# Patient Record
Sex: FEMALE | Race: WHITE | Employment: OTHER | URBAN - METROPOLITAN AREA
[De-identification: names, ages, dates, MRNs, and addresses within clinical notes are randomized per-mention and may not be internally consistent; named-entity substitution may affect disease eponyms.]

---

## 2023-08-23 RX ORDER — LATANOPROST 50 UG/ML
SOLUTION/ DROPS OPHTHALMIC
COMMUNITY

## 2023-08-23 RX ORDER — LEVOTHYROXINE SODIUM 0.1 MG/1
TABLET ORAL
COMMUNITY
Start: 2022-11-17

## 2023-08-23 RX ORDER — LOSARTAN POTASSIUM 100 MG/1
1 TABLET ORAL DAILY
COMMUNITY
Start: 2022-11-17

## 2023-08-23 RX ORDER — PRAVASTATIN SODIUM 20 MG
TABLET ORAL
COMMUNITY
Start: 2022-11-17

## 2023-08-23 RX ORDER — PANTOPRAZOLE SODIUM 40 MG/1
TABLET, DELAYED RELEASE ORAL
COMMUNITY
Start: 2022-11-17

## 2023-08-23 RX ORDER — SERTRALINE HYDROCHLORIDE 25 MG/1
TABLET, FILM COATED ORAL
COMMUNITY
Start: 2022-11-17

## 2023-08-23 RX ORDER — AMLODIPINE BESYLATE 5 MG/1
TABLET ORAL
COMMUNITY
Start: 2022-11-17

## 2023-08-24 ENCOUNTER — OFFICE VISIT (OUTPATIENT)
Dept: PRIMARY CARE CLINIC | Age: 88
End: 2023-08-24
Payer: MEDICARE

## 2023-08-24 VITALS
WEIGHT: 145.8 LBS | TEMPERATURE: 97.5 F | HEIGHT: 61 IN | SYSTOLIC BLOOD PRESSURE: 118 MMHG | DIASTOLIC BLOOD PRESSURE: 70 MMHG | BODY MASS INDEX: 27.53 KG/M2

## 2023-08-24 DIAGNOSIS — E03.9 HYPOTHYROIDISM, UNSPECIFIED TYPE: ICD-10-CM

## 2023-08-24 DIAGNOSIS — G89.29 CHRONIC BILATERAL LOW BACK PAIN WITHOUT SCIATICA: Primary | ICD-10-CM

## 2023-08-24 DIAGNOSIS — M54.50 CHRONIC BILATERAL LOW BACK PAIN WITHOUT SCIATICA: Primary | ICD-10-CM

## 2023-08-24 DIAGNOSIS — F33.42 RECURRENT MAJOR DEPRESSIVE DISORDER, IN FULL REMISSION (HCC): ICD-10-CM

## 2023-08-24 DIAGNOSIS — I10 PRIMARY HYPERTENSION: ICD-10-CM

## 2023-08-24 DIAGNOSIS — M35.01 SJOGREN'S SYNDROME WITH KERATOCONJUNCTIVITIS SICCA (HCC): ICD-10-CM

## 2023-08-24 PROCEDURE — G8427 DOCREV CUR MEDS BY ELIG CLIN: HCPCS | Performed by: STUDENT IN AN ORGANIZED HEALTH CARE EDUCATION/TRAINING PROGRAM

## 2023-08-24 PROCEDURE — 99204 OFFICE O/P NEW MOD 45 MIN: CPT | Performed by: STUDENT IN AN ORGANIZED HEALTH CARE EDUCATION/TRAINING PROGRAM

## 2023-08-24 PROCEDURE — G8419 CALC BMI OUT NRM PARAM NOF/U: HCPCS | Performed by: STUDENT IN AN ORGANIZED HEALTH CARE EDUCATION/TRAINING PROGRAM

## 2023-08-24 PROCEDURE — 1036F TOBACCO NON-USER: CPT | Performed by: STUDENT IN AN ORGANIZED HEALTH CARE EDUCATION/TRAINING PROGRAM

## 2023-08-24 PROCEDURE — 1123F ACP DISCUSS/DSCN MKR DOCD: CPT | Performed by: STUDENT IN AN ORGANIZED HEALTH CARE EDUCATION/TRAINING PROGRAM

## 2023-08-24 PROCEDURE — 1090F PRES/ABSN URINE INCON ASSESS: CPT | Performed by: STUDENT IN AN ORGANIZED HEALTH CARE EDUCATION/TRAINING PROGRAM

## 2023-08-24 RX ORDER — FAMOTIDINE 40 MG/1
TABLET, FILM COATED ORAL
COMMUNITY
Start: 2023-07-03

## 2023-08-24 RX ORDER — TRAMADOL HYDROCHLORIDE 50 MG/1
TABLET ORAL
COMMUNITY
Start: 2023-05-10

## 2023-08-24 SDOH — ECONOMIC STABILITY: HOUSING INSECURITY
IN THE LAST 12 MONTHS, WAS THERE A TIME WHEN YOU DID NOT HAVE A STEADY PLACE TO SLEEP OR SLEPT IN A SHELTER (INCLUDING NOW)?: NO

## 2023-08-24 SDOH — ECONOMIC STABILITY: FOOD INSECURITY: WITHIN THE PAST 12 MONTHS, THE FOOD YOU BOUGHT JUST DIDN'T LAST AND YOU DIDN'T HAVE MONEY TO GET MORE.: NEVER TRUE

## 2023-08-24 SDOH — ECONOMIC STABILITY: INCOME INSECURITY: HOW HARD IS IT FOR YOU TO PAY FOR THE VERY BASICS LIKE FOOD, HOUSING, MEDICAL CARE, AND HEATING?: NOT HARD AT ALL

## 2023-08-24 SDOH — ECONOMIC STABILITY: FOOD INSECURITY: WITHIN THE PAST 12 MONTHS, YOU WORRIED THAT YOUR FOOD WOULD RUN OUT BEFORE YOU GOT MONEY TO BUY MORE.: NEVER TRUE

## 2023-08-24 ASSESSMENT — PATIENT HEALTH QUESTIONNAIRE - PHQ9
6. FEELING BAD ABOUT YOURSELF - OR THAT YOU ARE A FAILURE OR HAVE LET YOURSELF OR YOUR FAMILY DOWN: 0
7. TROUBLE CONCENTRATING ON THINGS, SUCH AS READING THE NEWSPAPER OR WATCHING TELEVISION: 3
4. FEELING TIRED OR HAVING LITTLE ENERGY: 3
2. FEELING DOWN, DEPRESSED OR HOPELESS: 0
2. FEELING DOWN, DEPRESSED OR HOPELESS: 1
3. TROUBLE FALLING OR STAYING ASLEEP: 0
SUM OF ALL RESPONSES TO PHQ QUESTIONS 1-9: 7
SUM OF ALL RESPONSES TO PHQ9 QUESTIONS 1 & 2: 1
SUM OF ALL RESPONSES TO PHQ QUESTIONS 1-9: 7
SUM OF ALL RESPONSES TO PHQ QUESTIONS 1-9: 0
1. LITTLE INTEREST OR PLEASURE IN DOING THINGS: 0
SUM OF ALL RESPONSES TO PHQ QUESTIONS 1-9: 0
SUM OF ALL RESPONSES TO PHQ9 QUESTIONS 1 & 2: 0
SUM OF ALL RESPONSES TO PHQ QUESTIONS 1-9: 0
8. MOVING OR SPEAKING SO SLOWLY THAT OTHER PEOPLE COULD HAVE NOTICED. OR THE OPPOSITE, BEING SO FIGETY OR RESTLESS THAT YOU HAVE BEEN MOVING AROUND A LOT MORE THAN USUAL: 0
SUM OF ALL RESPONSES TO PHQ QUESTIONS 1-9: 7
5. POOR APPETITE OR OVEREATING: 0
SUM OF ALL RESPONSES TO PHQ QUESTIONS 1-9: 0
9. THOUGHTS THAT YOU WOULD BE BETTER OFF DEAD, OR OF HURTING YOURSELF: 0
1. LITTLE INTEREST OR PLEASURE IN DOING THINGS: 0
SUM OF ALL RESPONSES TO PHQ QUESTIONS 1-9: 7
10. IF YOU CHECKED OFF ANY PROBLEMS, HOW DIFFICULT HAVE THESE PROBLEMS MADE IT FOR YOU TO DO YOUR WORK, TAKE CARE OF THINGS AT HOME, OR GET ALONG WITH OTHER PEOPLE: 0

## 2023-08-24 ASSESSMENT — ENCOUNTER SYMPTOMS
RESPIRATORY NEGATIVE: 1
EYES NEGATIVE: 1
BACK PAIN: 1

## 2023-08-28 DIAGNOSIS — E03.9 HYPOTHYROIDISM, UNSPECIFIED TYPE: ICD-10-CM

## 2023-08-29 LAB
T4 FREE: 1.4 NG/DL (ref 0.9–1.7)
TSH SERPL DL<=0.05 MIU/L-ACNC: 11 UIU/ML (ref 0.27–4.2)

## 2023-08-30 ENCOUNTER — TELEPHONE (OUTPATIENT)
Dept: PRIMARY CARE CLINIC | Age: 88
End: 2023-08-30

## 2023-08-30 NOTE — TELEPHONE ENCOUNTER
Daughter called wanting to know lab results and if any additional medication is needed.  She would like someone to call her to discuss next steps regarding lab work

## 2023-08-31 DIAGNOSIS — E03.9 HYPOTHYROIDISM, UNSPECIFIED TYPE: Primary | ICD-10-CM

## 2023-08-31 RX ORDER — LEVOTHYROXINE SODIUM 0.1 MG/1
TABLET ORAL
Qty: 90 TABLET | Refills: 1 | Status: SHIPPED | OUTPATIENT
Start: 2023-08-31

## 2023-08-31 RX ORDER — LEVOTHYROXINE SODIUM 0.03 MG/1
TABLET ORAL
Qty: 30 TABLET | Refills: 5 | Status: SHIPPED | OUTPATIENT
Start: 2023-08-31

## 2023-11-21 DIAGNOSIS — E03.9 HYPOTHYROIDISM, UNSPECIFIED TYPE: ICD-10-CM

## 2023-11-21 DIAGNOSIS — I10 PRIMARY HYPERTENSION: Primary | ICD-10-CM

## 2023-11-21 DIAGNOSIS — E78.5 HYPERLIPIDEMIA, UNSPECIFIED HYPERLIPIDEMIA TYPE: ICD-10-CM

## 2023-11-21 RX ORDER — AMLODIPINE BESYLATE 5 MG/1
TABLET ORAL
Qty: 90 TABLET | Refills: 3 | Status: SHIPPED | OUTPATIENT
Start: 2023-11-21

## 2023-11-21 RX ORDER — PANTOPRAZOLE SODIUM 40 MG/1
TABLET, DELAYED RELEASE ORAL
Qty: 90 TABLET | Refills: 1 | Status: SHIPPED | OUTPATIENT
Start: 2023-11-21

## 2023-11-21 RX ORDER — PRAVASTATIN SODIUM 20 MG
TABLET ORAL
Qty: 90 TABLET | Refills: 1 | Status: SHIPPED | OUTPATIENT
Start: 2023-11-21

## 2023-11-21 RX ORDER — LOSARTAN POTASSIUM 100 MG/1
100 TABLET ORAL DAILY
Qty: 90 TABLET | Refills: 3 | Status: SHIPPED | OUTPATIENT
Start: 2023-11-21

## 2023-11-21 RX ORDER — SERTRALINE HYDROCHLORIDE 25 MG/1
TABLET, FILM COATED ORAL
Qty: 90 TABLET | Refills: 3 | Status: SHIPPED | OUTPATIENT
Start: 2023-11-21

## 2023-11-21 RX ORDER — LEVOTHYROXINE SODIUM 0.1 MG/1
TABLET ORAL
Qty: 90 TABLET | Refills: 1 | Status: SHIPPED | OUTPATIENT
Start: 2023-11-21

## 2023-11-21 RX ORDER — LEVOTHYROXINE SODIUM 0.03 MG/1
TABLET ORAL
Qty: 30 TABLET | Refills: 5 | Status: SHIPPED | OUTPATIENT
Start: 2023-11-21

## 2023-11-21 RX ORDER — FAMOTIDINE 40 MG/1
40 TABLET, FILM COATED ORAL NIGHTLY PRN
Qty: 90 TABLET | Refills: 1 | Status: SHIPPED | OUTPATIENT
Start: 2023-11-21 | End: 2024-02-19

## 2024-01-08 SDOH — HEALTH STABILITY: PHYSICAL HEALTH: ON AVERAGE, HOW MANY DAYS PER WEEK DO YOU ENGAGE IN MODERATE TO STRENUOUS EXERCISE (LIKE A BRISK WALK)?: 0 DAYS

## 2024-01-08 SDOH — HEALTH STABILITY: PHYSICAL HEALTH: ON AVERAGE, HOW MANY MINUTES DO YOU ENGAGE IN EXERCISE AT THIS LEVEL?: 0 MIN

## 2024-01-11 ENCOUNTER — APPOINTMENT (OUTPATIENT)
Dept: GENERAL RADIOLOGY | Age: 89
DRG: 177 | End: 2024-01-11
Payer: MEDICARE

## 2024-01-11 ENCOUNTER — OFFICE VISIT (OUTPATIENT)
Dept: PRIMARY CARE CLINIC | Age: 89
End: 2024-01-11
Payer: MEDICARE

## 2024-01-11 ENCOUNTER — HOSPITAL ENCOUNTER (INPATIENT)
Age: 89
LOS: 1 days | Discharge: HOME HEALTH | DRG: 177 | End: 2024-01-15
Attending: EMERGENCY MEDICINE | Admitting: INTERNAL MEDICINE
Payer: MEDICARE

## 2024-01-11 VITALS
DIASTOLIC BLOOD PRESSURE: 70 MMHG | HEART RATE: 80 BPM | BODY MASS INDEX: 27.85 KG/M2 | WEIGHT: 147.5 LBS | SYSTOLIC BLOOD PRESSURE: 128 MMHG | TEMPERATURE: 97.1 F | HEIGHT: 61 IN | OXYGEN SATURATION: 86 %

## 2024-01-11 DIAGNOSIS — J96.01 ACUTE RESPIRATORY FAILURE WITH HYPOXIA (HCC): Primary | ICD-10-CM

## 2024-01-11 DIAGNOSIS — I10 PRIMARY HYPERTENSION: ICD-10-CM

## 2024-01-11 DIAGNOSIS — M35.00 SJOGREN'S SYNDROME, WITH UNSPECIFIED ORGAN INVOLVEMENT (HCC): ICD-10-CM

## 2024-01-11 DIAGNOSIS — F32.A DEPRESSIVE DISORDER: ICD-10-CM

## 2024-01-11 DIAGNOSIS — E03.9 HYPOTHYROIDISM, UNSPECIFIED TYPE: ICD-10-CM

## 2024-01-11 DIAGNOSIS — J96.01 SEPSIS DUE TO STREPTOCOCCUS PNEUMONIAE WITH ACUTE HYPOXIC RESPIRATORY FAILURE WITHOUT SEPTIC SHOCK (HCC): Primary | ICD-10-CM

## 2024-01-11 DIAGNOSIS — R65.20 SEPSIS DUE TO STREPTOCOCCUS PNEUMONIAE WITH ACUTE HYPOXIC RESPIRATORY FAILURE WITHOUT SEPTIC SHOCK (HCC): Primary | ICD-10-CM

## 2024-01-11 DIAGNOSIS — U07.1 COVID-19: ICD-10-CM

## 2024-01-11 DIAGNOSIS — E78.5 HYPERLIPIDEMIA, UNSPECIFIED HYPERLIPIDEMIA TYPE: ICD-10-CM

## 2024-01-11 DIAGNOSIS — R41.89 IMPAIRED COGNITION: ICD-10-CM

## 2024-01-11 DIAGNOSIS — E87.1 HYPONATREMIA: ICD-10-CM

## 2024-01-11 DIAGNOSIS — A40.3 SEPSIS DUE TO STREPTOCOCCUS PNEUMONIAE WITH ACUTE HYPOXIC RESPIRATORY FAILURE WITHOUT SEPTIC SHOCK (HCC): Primary | ICD-10-CM

## 2024-01-11 DIAGNOSIS — M54.16 LUMBAR RADICULOPATHY: ICD-10-CM

## 2024-01-11 DIAGNOSIS — F33.42 RECURRENT MAJOR DEPRESSIVE DISORDER, IN FULL REMISSION (HCC): ICD-10-CM

## 2024-01-11 PROBLEM — R09.02 HYPOXIA: Status: ACTIVE | Noted: 2024-01-11

## 2024-01-11 LAB
ALBUMIN SERPL-MCNC: 3.6 G/DL (ref 3.5–5.2)
ALP SERPL-CCNC: 140 U/L (ref 35–104)
ALT SERPL-CCNC: 12 U/L (ref 0–32)
ANION GAP SERPL CALCULATED.3IONS-SCNC: 11 MMOL/L (ref 7–16)
AST SERPL-CCNC: 25 U/L (ref 0–31)
BASOPHILS # BLD: 0.03 K/UL (ref 0–0.2)
BASOPHILS NFR BLD: 0 % (ref 0–2)
BILIRUB SERPL-MCNC: 0.4 MG/DL (ref 0–1.2)
BNP SERPL-MCNC: 635 PG/ML (ref 0–450)
BUN SERPL-MCNC: 15 MG/DL (ref 6–23)
CALCIUM SERPL-MCNC: 9.4 MG/DL (ref 8.6–10.2)
CHLORIDE SERPL-SCNC: 92 MMOL/L (ref 98–107)
CO2 SERPL-SCNC: 27 MMOL/L (ref 22–29)
CREAT SERPL-MCNC: 0.5 MG/DL (ref 0.5–1)
EKG ATRIAL RATE: 76 BPM
EKG P AXIS: 13 DEGREES
EKG P-R INTERVAL: 160 MS
EKG Q-T INTERVAL: 400 MS
EKG QRS DURATION: 76 MS
EKG QTC CALCULATION (BAZETT): 450 MS
EKG R AXIS: -17 DEGREES
EKG T AXIS: -18 DEGREES
EKG VENTRICULAR RATE: 76 BPM
EOSINOPHIL # BLD: 0.61 K/UL (ref 0.05–0.5)
EOSINOPHILS RELATIVE PERCENT: 7 % (ref 0–6)
ERYTHROCYTE [DISTWIDTH] IN BLOOD BY AUTOMATED COUNT: 15.4 % (ref 11.5–15)
GFR SERPL CREATININE-BSD FRML MDRD: >60 ML/MIN/1.73M2
GLUCOSE SERPL-MCNC: 108 MG/DL (ref 74–99)
HCT VFR BLD AUTO: 33.6 % (ref 34–48)
HGB BLD-MCNC: 11.5 G/DL (ref 11.5–15.5)
IMM GRANULOCYTES # BLD AUTO: 0.05 K/UL (ref 0–0.58)
IMM GRANULOCYTES NFR BLD: 1 % (ref 0–5)
INFLUENZA A BY PCR: NOT DETECTED
INFLUENZA B BY PCR: NOT DETECTED
LACTATE BLDV-SCNC: 1.2 MMOL/L (ref 0.5–2.2)
LYMPHOCYTES NFR BLD: 0.68 K/UL (ref 1.5–4)
LYMPHOCYTES RELATIVE PERCENT: 7 % (ref 20–42)
MCH RBC QN AUTO: 27.1 PG (ref 26–35)
MCHC RBC AUTO-ENTMCNC: 34.2 G/DL (ref 32–34.5)
MCV RBC AUTO: 79.1 FL (ref 80–99.9)
MONOCYTES NFR BLD: 1.2 K/UL (ref 0.1–0.95)
MONOCYTES NFR BLD: 13 % (ref 2–12)
NEUTROPHILS NFR BLD: 73 % (ref 43–80)
NEUTS SEG NFR BLD: 6.82 K/UL (ref 1.8–7.3)
PLATELET # BLD AUTO: 398 K/UL (ref 130–450)
PMV BLD AUTO: 8.7 FL (ref 7–12)
POTASSIUM SERPL-SCNC: 3.8 MMOL/L (ref 3.5–5)
PROT SERPL-MCNC: 6.8 G/DL (ref 6.4–8.3)
RBC # BLD AUTO: 4.25 M/UL (ref 3.5–5.5)
RSV BY PCR: NOT DETECTED
SARS-COV-2 RDRP RESP QL NAA+PROBE: DETECTED
SODIUM SERPL-SCNC: 130 MMOL/L (ref 132–146)
SPECIMEN DESCRIPTION: ABNORMAL
SPECIMEN SOURCE: NORMAL
TROPONIN I SERPL HS-MCNC: 13 NG/L (ref 0–9)
WBC OTHER # BLD: 9.4 K/UL (ref 4.5–11.5)

## 2024-01-11 PROCEDURE — G0378 HOSPITAL OBSERVATION PER HR: HCPCS

## 2024-01-11 PROCEDURE — 93005 ELECTROCARDIOGRAM TRACING: CPT | Performed by: EMERGENCY MEDICINE

## 2024-01-11 PROCEDURE — 87502 INFLUENZA DNA AMP PROBE: CPT

## 2024-01-11 PROCEDURE — 71045 X-RAY EXAM CHEST 1 VIEW: CPT

## 2024-01-11 PROCEDURE — 6370000000 HC RX 637 (ALT 250 FOR IP): Performed by: EMERGENCY MEDICINE

## 2024-01-11 PROCEDURE — 96372 THER/PROPH/DIAG INJ SC/IM: CPT

## 2024-01-11 PROCEDURE — G8484 FLU IMMUNIZE NO ADMIN: HCPCS | Performed by: INTERNAL MEDICINE

## 2024-01-11 PROCEDURE — 80053 COMPREHEN METABOLIC PANEL: CPT

## 2024-01-11 PROCEDURE — 87635 SARS-COV-2 COVID-19 AMP PRB: CPT

## 2024-01-11 PROCEDURE — 99285 EMERGENCY DEPT VISIT HI MDM: CPT

## 2024-01-11 PROCEDURE — 6360000002 HC RX W HCPCS: Performed by: EMERGENCY MEDICINE

## 2024-01-11 PROCEDURE — 94664 DEMO&/EVAL PT USE INHALER: CPT

## 2024-01-11 PROCEDURE — 1090F PRES/ABSN URINE INCON ASSESS: CPT | Performed by: INTERNAL MEDICINE

## 2024-01-11 PROCEDURE — 96366 THER/PROPH/DIAG IV INF ADDON: CPT

## 2024-01-11 PROCEDURE — G8419 CALC BMI OUT NRM PARAM NOF/U: HCPCS | Performed by: INTERNAL MEDICINE

## 2024-01-11 PROCEDURE — 87634 RSV DNA/RNA AMP PROBE: CPT

## 2024-01-11 PROCEDURE — 1036F TOBACCO NON-USER: CPT | Performed by: INTERNAL MEDICINE

## 2024-01-11 PROCEDURE — 6360000002 HC RX W HCPCS

## 2024-01-11 PROCEDURE — 99215 OFFICE O/P EST HI 40 MIN: CPT | Performed by: INTERNAL MEDICINE

## 2024-01-11 PROCEDURE — 84484 ASSAY OF TROPONIN QUANT: CPT

## 2024-01-11 PROCEDURE — 6360000002 HC RX W HCPCS: Performed by: SPECIALIST

## 2024-01-11 PROCEDURE — 85025 COMPLETE CBC W/AUTO DIFF WBC: CPT

## 2024-01-11 PROCEDURE — 83605 ASSAY OF LACTIC ACID: CPT

## 2024-01-11 PROCEDURE — 93010 ELECTROCARDIOGRAM REPORT: CPT | Performed by: INTERNAL MEDICINE

## 2024-01-11 PROCEDURE — 86140 C-REACTIVE PROTEIN: CPT

## 2024-01-11 PROCEDURE — 96374 THER/PROPH/DIAG INJ IV PUSH: CPT

## 2024-01-11 PROCEDURE — 96375 TX/PRO/DX INJ NEW DRUG ADDON: CPT

## 2024-01-11 PROCEDURE — 96365 THER/PROPH/DIAG IV INF INIT: CPT

## 2024-01-11 PROCEDURE — 6370000000 HC RX 637 (ALT 250 FOR IP)

## 2024-01-11 PROCEDURE — 83880 ASSAY OF NATRIURETIC PEPTIDE: CPT

## 2024-01-11 PROCEDURE — G8427 DOCREV CUR MEDS BY ELIG CLIN: HCPCS | Performed by: INTERNAL MEDICINE

## 2024-01-11 PROCEDURE — 2580000003 HC RX 258: Performed by: SPECIALIST

## 2024-01-11 PROCEDURE — 84145 PROCALCITONIN (PCT): CPT

## 2024-01-11 PROCEDURE — 1123F ACP DISCUSS/DSCN MKR DOCD: CPT | Performed by: INTERNAL MEDICINE

## 2024-01-11 RX ORDER — LATANOPROST 50 UG/ML
1 SOLUTION/ DROPS OPHTHALMIC NIGHTLY
Status: DISCONTINUED | OUTPATIENT
Start: 2024-01-11 | End: 2024-01-15 | Stop reason: HOSPADM

## 2024-01-11 RX ORDER — IPRATROPIUM BROMIDE AND ALBUTEROL SULFATE 2.5; .5 MG/3ML; MG/3ML
1 SOLUTION RESPIRATORY (INHALATION) ONCE
Status: COMPLETED | OUTPATIENT
Start: 2024-01-11 | End: 2024-01-11

## 2024-01-11 RX ORDER — PANTOPRAZOLE SODIUM 40 MG/1
TABLET, DELAYED RELEASE ORAL
Qty: 90 TABLET | Refills: 1 | Status: ON HOLD | OUTPATIENT
Start: 2024-01-11

## 2024-01-11 RX ORDER — ENOXAPARIN SODIUM 100 MG/ML
40 INJECTION SUBCUTANEOUS DAILY
Status: DISCONTINUED | OUTPATIENT
Start: 2024-01-11 | End: 2024-01-15 | Stop reason: HOSPADM

## 2024-01-11 RX ORDER — LEVOTHYROXINE SODIUM 0.03 MG/1
25 TABLET ORAL
Status: DISCONTINUED | OUTPATIENT
Start: 2024-01-12 | End: 2024-01-15 | Stop reason: HOSPADM

## 2024-01-11 RX ORDER — POLYVINYL ALCOHOL 14 MG/ML
1 SOLUTION/ DROPS OPHTHALMIC EVERY 4 HOURS PRN
Status: DISCONTINUED | OUTPATIENT
Start: 2024-01-11 | End: 2024-01-11 | Stop reason: CLARIF

## 2024-01-11 RX ORDER — PRAVASTATIN SODIUM 20 MG
20 TABLET ORAL NIGHTLY
Status: DISCONTINUED | OUTPATIENT
Start: 2024-01-11 | End: 2024-01-15 | Stop reason: HOSPADM

## 2024-01-11 RX ORDER — ARFORMOTEROL TARTRATE 15 UG/2ML
15 SOLUTION RESPIRATORY (INHALATION)
Status: DISCONTINUED | OUTPATIENT
Start: 2024-01-11 | End: 2024-01-15 | Stop reason: HOSPADM

## 2024-01-11 RX ORDER — FUROSEMIDE 10 MG/ML
20 INJECTION INTRAMUSCULAR; INTRAVENOUS DAILY
Status: DISCONTINUED | OUTPATIENT
Start: 2024-01-11 | End: 2024-01-13

## 2024-01-11 RX ORDER — BUDESONIDE 0.5 MG/2ML
500 INHALANT ORAL
Status: DISCONTINUED | OUTPATIENT
Start: 2024-01-11 | End: 2024-01-15 | Stop reason: HOSPADM

## 2024-01-11 RX ORDER — CYCLOSPORINE 0.5 MG/ML
1 EMULSION OPHTHALMIC 2 TIMES DAILY
Status: ON HOLD | COMMUNITY

## 2024-01-11 RX ORDER — DEXAMETHASONE SODIUM PHOSPHATE 10 MG/ML
6 INJECTION INTRAMUSCULAR; INTRAVENOUS EVERY 24 HOURS
Status: DISCONTINUED | OUTPATIENT
Start: 2024-01-12 | End: 2024-01-13

## 2024-01-11 RX ORDER — LOSARTAN POTASSIUM 50 MG/1
100 TABLET ORAL DAILY
Status: DISCONTINUED | OUTPATIENT
Start: 2024-01-11 | End: 2024-01-15 | Stop reason: HOSPADM

## 2024-01-11 RX ORDER — LEVOTHYROXINE SODIUM 0.1 MG/1
100 TABLET ORAL DAILY
Status: DISCONTINUED | OUTPATIENT
Start: 2024-01-12 | End: 2024-01-15 | Stop reason: HOSPADM

## 2024-01-11 RX ORDER — LEVOTHYROXINE SODIUM 0.1 MG/1
TABLET ORAL
Qty: 90 TABLET | Refills: 1 | Status: ON HOLD | OUTPATIENT
Start: 2024-01-11

## 2024-01-11 RX ORDER — TRAMADOL HYDROCHLORIDE 50 MG/1
50 TABLET ORAL 2 TIMES DAILY
Status: DISCONTINUED | OUTPATIENT
Start: 2024-01-11 | End: 2024-01-15 | Stop reason: HOSPADM

## 2024-01-11 RX ORDER — DEXAMETHASONE SODIUM PHOSPHATE 10 MG/ML
10 INJECTION INTRAMUSCULAR; INTRAVENOUS ONCE
Status: COMPLETED | OUTPATIENT
Start: 2024-01-11 | End: 2024-01-11

## 2024-01-11 RX ORDER — TRAMADOL HYDROCHLORIDE 50 MG/1
TABLET ORAL
Status: CANCELLED | OUTPATIENT
Start: 2024-01-11

## 2024-01-11 RX ORDER — DEXAMETHASONE SODIUM PHOSPHATE 10 MG/ML
10 INJECTION INTRAMUSCULAR; INTRAVENOUS ONCE
Status: DISCONTINUED | OUTPATIENT
Start: 2024-01-11 | End: 2024-01-11

## 2024-01-11 RX ORDER — PANTOPRAZOLE SODIUM 40 MG/1
40 TABLET, DELAYED RELEASE ORAL
Status: DISCONTINUED | OUTPATIENT
Start: 2024-01-12 | End: 2024-01-15 | Stop reason: HOSPADM

## 2024-01-11 RX ORDER — FAMOTIDINE 40 MG/1
40 TABLET, FILM COATED ORAL NIGHTLY
Qty: 90 TABLET | Refills: 1 | Status: ON HOLD | OUTPATIENT
Start: 2024-01-11 | End: 2024-07-09

## 2024-01-11 RX ORDER — IPRATROPIUM BROMIDE AND ALBUTEROL SULFATE 2.5; .5 MG/3ML; MG/3ML
1 SOLUTION RESPIRATORY (INHALATION) EVERY 4 HOURS PRN
Status: DISCONTINUED | OUTPATIENT
Start: 2024-01-11 | End: 2024-01-15 | Stop reason: HOSPADM

## 2024-01-11 RX ORDER — AMLODIPINE BESYLATE 5 MG/1
5 TABLET ORAL DAILY
Status: DISCONTINUED | OUTPATIENT
Start: 2024-01-12 | End: 2024-01-15 | Stop reason: HOSPADM

## 2024-01-11 RX ORDER — PRAVASTATIN SODIUM 20 MG
TABLET ORAL
Qty: 90 TABLET | Refills: 1 | Status: ON HOLD | OUTPATIENT
Start: 2024-01-11

## 2024-01-11 RX ADMIN — AZITHROMYCIN MONOHYDRATE 500 MG: 500 INJECTION, POWDER, LYOPHILIZED, FOR SOLUTION INTRAVENOUS at 21:18

## 2024-01-11 RX ADMIN — ENOXAPARIN SODIUM 40 MG: 100 INJECTION SUBCUTANEOUS at 21:14

## 2024-01-11 RX ADMIN — LOSARTAN POTASSIUM 100 MG: 50 TABLET, FILM COATED ORAL at 21:04

## 2024-01-11 RX ADMIN — PRAVASTATIN SODIUM 20 MG: 20 TABLET ORAL at 21:04

## 2024-01-11 RX ADMIN — CEFTRIAXONE SODIUM 2000 MG: 2 INJECTION, POWDER, FOR SOLUTION INTRAMUSCULAR; INTRAVENOUS at 21:08

## 2024-01-11 RX ADMIN — DEXAMETHASONE SODIUM PHOSPHATE 10 MG: 10 INJECTION INTRAMUSCULAR; INTRAVENOUS at 18:21

## 2024-01-11 RX ADMIN — IPRATROPIUM BROMIDE AND ALBUTEROL SULFATE 1 DOSE: .5; 2.5 SOLUTION RESPIRATORY (INHALATION) at 17:48

## 2024-01-11 RX ADMIN — FUROSEMIDE 20 MG: 10 INJECTION, SOLUTION INTRAMUSCULAR; INTRAVENOUS at 21:13

## 2024-01-11 RX ADMIN — TRAMADOL HYDROCHLORIDE 50 MG: 50 TABLET ORAL at 21:04

## 2024-01-11 ASSESSMENT — PATIENT HEALTH QUESTIONNAIRE - PHQ9
SUM OF ALL RESPONSES TO PHQ9 QUESTIONS 1 & 2: 0
SUM OF ALL RESPONSES TO PHQ QUESTIONS 1-9: 0
SUM OF ALL RESPONSES TO PHQ QUESTIONS 1-9: 0
6. FEELING BAD ABOUT YOURSELF - OR THAT YOU ARE A FAILURE OR HAVE LET YOURSELF OR YOUR FAMILY DOWN: 0
2. FEELING DOWN, DEPRESSED OR HOPELESS: 0
SUM OF ALL RESPONSES TO PHQ QUESTIONS 1-9: 0
8. MOVING OR SPEAKING SO SLOWLY THAT OTHER PEOPLE COULD HAVE NOTICED. OR THE OPPOSITE, BEING SO FIGETY OR RESTLESS THAT YOU HAVE BEEN MOVING AROUND A LOT MORE THAN USUAL: 0
SUM OF ALL RESPONSES TO PHQ QUESTIONS 1-9: 0
5. POOR APPETITE OR OVEREATING: 0
4. FEELING TIRED OR HAVING LITTLE ENERGY: 0
1. LITTLE INTEREST OR PLEASURE IN DOING THINGS: 0
9. THOUGHTS THAT YOU WOULD BE BETTER OFF DEAD, OR OF HURTING YOURSELF: 0
10. IF YOU CHECKED OFF ANY PROBLEMS, HOW DIFFICULT HAVE THESE PROBLEMS MADE IT FOR YOU TO DO YOUR WORK, TAKE CARE OF THINGS AT HOME, OR GET ALONG WITH OTHER PEOPLE: 0
3. TROUBLE FALLING OR STAYING ASLEEP: 0
7. TROUBLE CONCENTRATING ON THINGS, SUCH AS READING THE NEWSPAPER OR WATCHING TELEVISION: 0

## 2024-01-11 ASSESSMENT — PAIN - FUNCTIONAL ASSESSMENT: PAIN_FUNCTIONAL_ASSESSMENT: NONE - DENIES PAIN

## 2024-01-11 ASSESSMENT — PAIN SCALES - GENERAL: PAINLEVEL_OUTOF10: 5

## 2024-01-11 NOTE — PROGRESS NOTES
HPI:  The patient is a 89 y.o. female who presents today to establish care.  Patient is a 89 y/o F with a PMHx of Sjogren's, depression, dementia, GERD, hypothyroidism and chronic back pain who presents to establish care.   Chronic back pain- has been on tramadol 50 mg TID     She is primarily wheel chair bound now;  she lives with her daughter who is accompanying her today with the caregiver.  As a matter fact she just fell down around New Year's    Patient has been sick for the last 10 to 12 days with progressive coughing spells associated with some low-grade temperature and mild swelling of bilateral lower extremities.  She appears very exhausted and worn out.  Her pulse ox today is 86% and she is wearing her gloves secondary to her Raynaud's disease.  She is breathing heavily with some audible rhonchi.  On exam there is decreased bilateral air exchange with scattered rhonchi especially on the right base suggestive of probably pneumonia.  Patient and her daughter were counseled in length that she needs to be admitted to the hospital to get some IV antibiotics and IV fluid hydration and check her chest x-ray and her lab work.  She was tested for COVID yesterday and was negative.     She has a baseline of dementia and very hard of hearing.  She does have some incontinence at night and wears depends at night      Hypothyroidism- on synthroid 100 mcg and had this adjusted in January but has not had labs drawn since to check levels     GERD- on PPI and famotidine     HTN- on losartan and amlodipine and BP well controlled     Depression- she is on sertraline 25 mg; she had been on 50 mg in the past and felt this was too much for her      She follows with dermatology     She has a history of raynaud's and often wears gloves, even in summer      Past Medical History:   Diagnosis Date    Cataract     Hx of Sjogren's disease (HCC)     Raynaud disease     Thyroid cancer (HCC)       No past surgical history on file.   No

## 2024-01-11 NOTE — ED PROVIDER NOTES
INSTILL 1 DROP INTO AFFECTED EYE(S) BY OPHTHALMIC ROUTE ONCE DAILY IN THE EVENING    LEVOTHYROXINE (SYNTHROID) 100 MCG TABLET    Take 1 tablet every day by oral route for 90 days.    LEVOTHYROXINE (SYNTHROID) 25 MCG TABLET    Take 1 pill on Monday, Wednesday and Friday    LOSARTAN (COZAAR) 100 MG TABLET    Take 1 tablet by mouth daily    MULTIPLE VITAMINS-MINERALS (PRESERVISION AREDS PO)    one tablet daily    PANTOPRAZOLE (PROTONIX) 40 MG TABLET    one tablet daily 30 minuters before lunch    POLYETHYL GLYCOL-PROPYL GLYCOL 0.4-0.3 % (SYSTANE) 0.4-0.3 % OPHTHALMIC SOLUTION    Place 1 drop into both eyes as needed for Dry Eyes    PRAVASTATIN (PRAVACHOL) 20 MG TABLET    Take 1 tablet every day by oral route.    SERTRALINE (ZOLOFT) 25 MG TABLET    Take 1 tablet every day by oral route for 90 days.    TRAMADOL (ULTRAM) 50 MG TABLET    Take 1 tablet every 8 hours by oral route as needed.       ALLERGIES     Atenolol and Tetracycline    FAMILYHISTORY     History reviewed. No pertinent family history.     SOCIAL HISTORY       Social History     Tobacco Use    Smoking status: Never     Passive exposure: Never    Smokeless tobacco: Never   Vaping Use    Vaping Use: Never used   Substance Use Topics    Alcohol use: Not Currently    Drug use: Never       SCREENINGS        Paolo Coma Scale  Eye Opening: Spontaneous  Best Verbal Response: Confused  Best Motor Response: Obeys commands  [place] Coma Scale Score: 14                CIWA Assessment  BP: (!) 160/76  Pulse: 90           PHYSICAL EXAM  1 or more Elements     ED Triage Vitals [01/11/24 1458]   BP Temp Temp Source Pulse Respirations SpO2 Height Weight - Scale   (!) 156/77 98.1 °F (36.7 °C) Oral 76 20 97 % 1.575 m (5' 2\") 66.7 kg (147 lb)       Constitutional/General: Alert and oriented x3  Head: Normocephalic and atraumatic  Eyes: PERRL, EOMI, conjunctiva normal, sclera non icteric  ENT:  Oropharynx clear, handling secretions, no trismus, no asymmetry of the posterior  considerations/shared decision making with patient, consults, disposition:        ED Course as of 01/11/24 2352   Thu Jan 11, 2024   1612 EKG Interpretation  Interpreted by emergency department physician, Dr. Boone    Rhythm: normal sinus   Rate: normal  Axis: normal  Ectopy: none  Conduction: normal  ST Segments: no acute change  T Waves: no acute change  Q Waves: none    Clinical Impression: non-specific EKG   [HH]   1654 Chest x-ray inter by me, pulmonary congestion versus likely findings of COVID [HH]      ED Course User Index  [HH] Sangita Boone, DO       Medical Decision Making  Amount and/or Complexity of Data Reviewed  Labs: ordered.  Radiology: ordered.  ECG/medicine tests: ordered.    Risk  Prescription drug management.  Decision regarding hospitalization.        MDM:  The differential diagnosis associated with the patient’s presentation includes: Acute COPD exacerbation, Hypoxemic Respiratory Distress, Pneumonia,  Sepsis, Congestive Heart Failure, Myocardial Infarction, Pulmonary Embolus, ARDS,  Pneumothorax, other    My independent interpretation of the EKG and/or imaging in ED Course.     Discussed with radiology regarding test interpretationsodium    Additional history obtained from or confirmed by: EMS    Management of the patient was discussed with: Dr Thomas    Escalation of care including admission/observation considered:admission    Diallo Johnson is a 89 y.o. female brought in by EMS from PCPs office.  She has been ill for 10 days with URI symptoms.  She was seen at her PCPs office today as she was not getting any better and found to be hypoxic.  She denies a history of Raynaud's.  On arrival here are finger pulse ox is unable to  a reading.  A pulse ox was placed on her ear and she is 100% on room air.  Denies chest pain.  Daughter also notes peripheral edema.  Denies fever or chills.  Denies nausea vomiting.    Laboratory evaluation shows an unremarkable CBC, lactic is 1.2, troponin was 13,

## 2024-01-12 PROBLEM — U07.1 COVID-19: Status: ACTIVE | Noted: 2024-01-12

## 2024-01-12 LAB
25(OH)D3 SERPL-MCNC: 34.3 NG/ML (ref 30–100)
ALBUMIN SERPL-MCNC: 3.4 G/DL (ref 3.5–5.2)
ALP SERPL-CCNC: 121 U/L (ref 35–104)
ALT SERPL-CCNC: 9 U/L (ref 0–32)
ANION GAP SERPL CALCULATED.3IONS-SCNC: 12 MMOL/L (ref 7–16)
AST SERPL-CCNC: 19 U/L (ref 0–31)
BASOPHILS # BLD: 0 K/UL (ref 0–0.2)
BASOPHILS NFR BLD: 0 % (ref 0–2)
BILIRUB SERPL-MCNC: 0.2 MG/DL (ref 0–1.2)
BUN SERPL-MCNC: 14 MG/DL (ref 6–23)
CALCIUM SERPL-MCNC: 8.8 MG/DL (ref 8.6–10.2)
CHLORIDE SERPL-SCNC: 92 MMOL/L (ref 98–107)
CHOLEST SERPL-MCNC: 121 MG/DL
CO2 SERPL-SCNC: 27 MMOL/L (ref 22–29)
CREAT SERPL-MCNC: 0.5 MG/DL (ref 0.5–1)
CRP SERPL HS-MCNC: <3 MG/L (ref 0–5)
EKG ATRIAL RATE: 88 BPM
EKG P AXIS: 6 DEGREES
EKG P-R INTERVAL: 162 MS
EKG Q-T INTERVAL: 374 MS
EKG QRS DURATION: 72 MS
EKG QTC CALCULATION (BAZETT): 452 MS
EKG R AXIS: -12 DEGREES
EKG T AXIS: 5 DEGREES
EKG VENTRICULAR RATE: 88 BPM
EOSINOPHIL # BLD: 0 K/UL (ref 0.05–0.5)
EOSINOPHILS RELATIVE PERCENT: 0 % (ref 0–6)
ERYTHROCYTE [DISTWIDTH] IN BLOOD BY AUTOMATED COUNT: 15.6 % (ref 11.5–15)
FOLATE SERPL-MCNC: >20 NG/ML (ref 4.8–24.2)
GFR SERPL CREATININE-BSD FRML MDRD: >60 ML/MIN/1.73M2
GLUCOSE SERPL-MCNC: 152 MG/DL (ref 74–99)
HCT VFR BLD AUTO: 31.4 % (ref 34–48)
HDLC SERPL-MCNC: 55 MG/DL
HGB BLD-MCNC: 10.5 G/DL (ref 11.5–15.5)
IRON SATN MFR SERPL: 12 % (ref 15–50)
IRON SERPL-MCNC: 27 UG/DL (ref 37–145)
LDLC SERPL CALC-MCNC: 54 MG/DL
LYMPHOCYTES NFR BLD: 0.55 K/UL (ref 1.5–4)
LYMPHOCYTES RELATIVE PERCENT: 10 % (ref 20–42)
MCH RBC QN AUTO: 25.8 PG (ref 26–35)
MCHC RBC AUTO-ENTMCNC: 33.4 G/DL (ref 32–34.5)
MCV RBC AUTO: 77.1 FL (ref 80–99.9)
MONOCYTES NFR BLD: 0.1 K/UL (ref 0.1–0.95)
MONOCYTES NFR BLD: 2 % (ref 2–12)
NEUTROPHILS NFR BLD: 89 % (ref 43–80)
NEUTS SEG NFR BLD: 5.06 K/UL (ref 1.8–7.3)
PLATELET # BLD AUTO: 386 K/UL (ref 130–450)
PMV BLD AUTO: 8.6 FL (ref 7–12)
POTASSIUM SERPL-SCNC: 4 MMOL/L (ref 3.5–5)
PROCALCITONIN SERPL-MCNC: 0.06 NG/ML (ref 0–0.08)
PROCALCITONIN SERPL-MCNC: 0.06 NG/ML (ref 0–0.08)
PROT SERPL-MCNC: 6.3 G/DL (ref 6.4–8.3)
RBC # BLD AUTO: 4.07 M/UL (ref 3.5–5.5)
RBC # BLD: ABNORMAL 10*6/UL
SODIUM SERPL-SCNC: 131 MMOL/L (ref 132–146)
T4 FREE SERPL-MCNC: 1.5 NG/DL (ref 0.9–1.7)
TIBC SERPL-MCNC: 221 UG/DL (ref 250–450)
TRIGL SERPL-MCNC: 58 MG/DL
TROPONIN I SERPL HS-MCNC: 9 NG/L (ref 0–9)
TSH SERPL DL<=0.05 MIU/L-ACNC: 1.44 UIU/ML (ref 0.27–4.2)
VIT B12 SERPL-MCNC: >2000 PG/ML (ref 211–946)
VLDLC SERPL CALC-MCNC: 12 MG/DL
WBC OTHER # BLD: 5.7 K/UL (ref 4.5–11.5)

## 2024-01-12 PROCEDURE — 84145 PROCALCITONIN (PCT): CPT

## 2024-01-12 PROCEDURE — 96366 THER/PROPH/DIAG IV INF ADDON: CPT

## 2024-01-12 PROCEDURE — G0378 HOSPITAL OBSERVATION PER HR: HCPCS

## 2024-01-12 PROCEDURE — 93010 ELECTROCARDIOGRAM REPORT: CPT | Performed by: INTERNAL MEDICINE

## 2024-01-12 PROCEDURE — 82746 ASSAY OF FOLIC ACID SERUM: CPT

## 2024-01-12 PROCEDURE — 85025 COMPLETE CBC W/AUTO DIFF WBC: CPT

## 2024-01-12 PROCEDURE — 6360000002 HC RX W HCPCS

## 2024-01-12 PROCEDURE — 97530 THERAPEUTIC ACTIVITIES: CPT | Performed by: PHYSICAL THERAPIST

## 2024-01-12 PROCEDURE — 84484 ASSAY OF TROPONIN QUANT: CPT

## 2024-01-12 PROCEDURE — 83550 IRON BINDING TEST: CPT

## 2024-01-12 PROCEDURE — 82306 VITAMIN D 25 HYDROXY: CPT

## 2024-01-12 PROCEDURE — 80053 COMPREHEN METABOLIC PANEL: CPT

## 2024-01-12 PROCEDURE — 84439 ASSAY OF FREE THYROXINE: CPT

## 2024-01-12 PROCEDURE — 6370000000 HC RX 637 (ALT 250 FOR IP)

## 2024-01-12 PROCEDURE — 86140 C-REACTIVE PROTEIN: CPT

## 2024-01-12 PROCEDURE — 96376 TX/PRO/DX INJ SAME DRUG ADON: CPT

## 2024-01-12 PROCEDURE — 2580000003 HC RX 258: Performed by: SPECIALIST

## 2024-01-12 PROCEDURE — 6360000002 HC RX W HCPCS: Performed by: SPECIALIST

## 2024-01-12 PROCEDURE — 83540 ASSAY OF IRON: CPT

## 2024-01-12 PROCEDURE — 80061 LIPID PANEL: CPT

## 2024-01-12 PROCEDURE — 96372 THER/PROPH/DIAG INJ SC/IM: CPT

## 2024-01-12 PROCEDURE — 97161 PT EVAL LOW COMPLEX 20 MIN: CPT | Performed by: PHYSICAL THERAPIST

## 2024-01-12 PROCEDURE — 87081 CULTURE SCREEN ONLY: CPT

## 2024-01-12 PROCEDURE — 82607 VITAMIN B-12: CPT

## 2024-01-12 PROCEDURE — 84207 ASSAY OF VITAMIN B-6: CPT

## 2024-01-12 PROCEDURE — 94640 AIRWAY INHALATION TREATMENT: CPT

## 2024-01-12 PROCEDURE — 84443 ASSAY THYROID STIM HORMONE: CPT

## 2024-01-12 PROCEDURE — 93005 ELECTROCARDIOGRAM TRACING: CPT

## 2024-01-12 RX ORDER — SODIUM CHLORIDE 0.9 % (FLUSH) 0.9 %
SYRINGE (ML) INJECTION
Status: DISPENSED
Start: 2024-01-12 | End: 2024-01-12

## 2024-01-12 RX ADMIN — PANTOPRAZOLE SODIUM 40 MG: 40 TABLET, DELAYED RELEASE ORAL at 06:22

## 2024-01-12 RX ADMIN — LEVOTHYROXINE SODIUM 25 MCG: 25 TABLET ORAL at 06:22

## 2024-01-12 RX ADMIN — PRAVASTATIN SODIUM 20 MG: 20 TABLET ORAL at 21:21

## 2024-01-12 RX ADMIN — FUROSEMIDE 20 MG: 10 INJECTION, SOLUTION INTRAMUSCULAR; INTRAVENOUS at 09:48

## 2024-01-12 RX ADMIN — SERTRALINE HYDROCHLORIDE 25 MG: 50 TABLET ORAL at 09:47

## 2024-01-12 RX ADMIN — TRAMADOL HYDROCHLORIDE 50 MG: 50 TABLET ORAL at 09:47

## 2024-01-12 RX ADMIN — LEVOTHYROXINE SODIUM 100 MCG: 0.05 TABLET ORAL at 06:21

## 2024-01-12 RX ADMIN — BUDESONIDE 500 MCG: 0.5 SUSPENSION RESPIRATORY (INHALATION) at 20:05

## 2024-01-12 RX ADMIN — LOSARTAN POTASSIUM 100 MG: 50 TABLET, FILM COATED ORAL at 09:46

## 2024-01-12 RX ADMIN — BUDESONIDE 500 MCG: 0.5 SUSPENSION RESPIRATORY (INHALATION) at 05:46

## 2024-01-12 RX ADMIN — ARFORMOTEROL TARTRATE 15 MCG: 15 SOLUTION RESPIRATORY (INHALATION) at 20:05

## 2024-01-12 RX ADMIN — AMLODIPINE BESYLATE 5 MG: 5 TABLET ORAL at 09:46

## 2024-01-12 RX ADMIN — TRAMADOL HYDROCHLORIDE 50 MG: 50 TABLET ORAL at 21:21

## 2024-01-12 RX ADMIN — CEFTRIAXONE SODIUM 2000 MG: 2 INJECTION, POWDER, FOR SOLUTION INTRAMUSCULAR; INTRAVENOUS at 21:21

## 2024-01-12 RX ADMIN — AZITHROMYCIN MONOHYDRATE 500 MG: 500 INJECTION, POWDER, LYOPHILIZED, FOR SOLUTION INTRAVENOUS at 21:34

## 2024-01-12 RX ADMIN — DEXAMETHASONE SODIUM PHOSPHATE 6 MG: 10 INJECTION INTRAMUSCULAR; INTRAVENOUS at 09:48

## 2024-01-12 RX ADMIN — ENOXAPARIN SODIUM 40 MG: 100 INJECTION SUBCUTANEOUS at 21:21

## 2024-01-12 RX ADMIN — ARFORMOTEROL TARTRATE 15 MCG: 15 SOLUTION RESPIRATORY (INHALATION) at 05:46

## 2024-01-12 ASSESSMENT — PAIN SCALES - GENERAL: PAINLEVEL_OUTOF10: 5

## 2024-01-12 ASSESSMENT — LIFESTYLE VARIABLES
HOW OFTEN DO YOU HAVE A DRINK CONTAINING ALCOHOL: NEVER
HOW MANY STANDARD DRINKS CONTAINING ALCOHOL DO YOU HAVE ON A TYPICAL DAY: PATIENT DOES NOT DRINK

## 2024-01-12 NOTE — CONSULTS
Consult Note            Date:1/12/2024        Patient Name:Diallo Johnson     YOB: 1930     Age:89 y.o.    Inpatient consult to Infectious Diseases  Consult performed by: Gladys Steiner MD  Consult ordered by: Kain Orr APRN - TODD        Chief Complaint     Chief Complaint   Patient presents with    Shortness of Breath     Sent in from doctor's office for hypoxia    Nasal Congestion     For past 10 days          History Obtained From   patient, family member - daughter, electronic medical record    History of Present Illness   Diallo Johnson is a 89 y.o. female who  has a past medical history of Cataract, Hx of Sjogren's disease (HCC), Raynaud disease, and Thyroid cancer (HCC).   Pt is not known to ID/[facility]  PT WAS SEEN IN ER   Daughter present   Pt presents on 1/11/2024 with   Chief Complaint   Patient presents with    Shortness of Breath     Sent in from doctor's office for hypoxia    Nasal Congestion     For past 10 days     Pt from Dr Cohen's office with not feeling well  Pt has had a croupy cough for 7-10 days no phlegm  Has had fevers off/on   Has low body temp normally   12/4 had amoxicillin pre dental procedure   No o2 at home   Worked as ortho nurse  Social drink no tob  No pets  No travelled outside [place]/[facility]/tours  Travelled through out [place]  Afebrile tachy bp156/77 p70-90 2L  Wbc9.4 cr0.5   Covid+  Flu a/b neg   Covdi boosted   Vaccinated for flu/covid/pneumonia     Pt in bed asleep but arousable     XR CHEST PORTABLE    Result Date: 1/11/2024  Probable pulmonary vascular congestive changes.       Past Medical History     Past Medical History:   Diagnosis Date    Cataract     Hx of Sjogren's disease (HCC)     Raynaud disease     Thyroid cancer (HCC)         Past Surgical History   History reviewed. No pertinent surgical history.     Medications   Not in a hospital admission.    CURRENT MEDS  sodium chloride flush 0.9 % injection,   amLODIPine (NORVASC) tablet 5 mg,  Completed Specimen: Nasopharyngeal Swab Updated: 01/11/24 1634     Specimen Description .NASOPHARYNGEAL SWAB     SARS-CoV-2, Rapid DETECTED     Comment:       Rapid NATT:  Negative results should be treated as presumptive and, if inconsistent with clinical signs   and symptoms or necessary for patient management,  should be tested with an alternative molecular assay. Negative results do not preclude   SARS-CoV-2 infection and   should not be used as the sole basis for patient management decisions.   This test has been authorized by the FDA under an Emergency Use Authorization (EUA) for use   by authorized laboratories.        Fact sheet for Healthcare Providers: https://www.fda.gov/media/573002/download  Fact sheet for Patients: https://www.fda.gov/media/410223/download          Methodology: Isothermal Nucleic Acid Amplification         Influenza A+B, PCR [[id number]] Collected: 01/11/24 1515    Order Status: Completed Specimen: Nasal Updated: 01/11/24 1650     Influenza A by PCR Not Detected     Comment: Methodology: Isothermal Nucleic Acid Amplification        Influenza B by PCR Not Detected     Comment: Methodology: Isothermal Nucleic Acid Amplification       Rapid RSV Antigen [[id number]] Collected: 01/11/24 1515    Order Status: Completed Specimen: Nasopharyngeal Swab Updated: 01/11/24 1650     Source .NARES     RSV by PCR Not Detected            Imaging/Diagnostics   XR CHEST PORTABLE    Result Date: 1/11/2024  Probable pulmonary vascular congestive changes.       Assessment/PLAN     Hospital Problems             Last Modified POA    * (Principal) Hypoxia 1/11/2024 Yes     Covid on o2   -vaccinated   -procal 0.06  -crp<3  -not a candidate at this time but o2 requirement increasing   Check cx mrsa rsp cx   H/p Sjogren/thyroid CA    Decadron   cefTRIAXone (ROCEPHIN) 2,000 mg in sterile water 20 mL IV syringe, Q24H  azithromycin (ZITHROMAX) 500 mg in sodium chloride 0.9 % 250 mL IVPB ([id number]), Q24H    Follow

## 2024-01-12 NOTE — H&P
[address] [address]                              HISTORY AND PHYSICAL    PATIENT NAME: GENIE MTZ                      :        1930  MED REC NO:   [id number]                            ROOM:       15  ACCOUNT NO:   [id number]                           ADMIT DATE: 2024  PROVIDER:     Shamar Thomas DO    CHIEF COMPLAINT AND HISTORY OF CHIEF COMPLAINT:  This is a pleasant  89-year-old white female who was admitted [facility].  The  patient presented to the hospital here through the emergency room.  The  patient normally sees Dr. Cohen.  Prior to that, she did see Dr. Nilesh Moore, but has been currently following up with Dr. Addis Cohen.  The  patient states that she has been ill for approximately past days with  upper respiratory symptoms.  The patient was seen by the primary care  doctor today, which time she was getting better and was found to be  hypoxic.  The patient at that time was referred to the hospital here  where she was evaluated.  On presentation here, the patient did test  positive for COVID.  She was having nonproductive cough and chest x-ray  did show some mild pulmonary congestion.  Because of the patient's  symptomatology, she is being admitted to the hospital at this time.   From a cardiac standpoint of view, the patient currently denies any  chest pain.  She does have some mild exertional dyspnea with  nonproductive cough at the present time.  She does have a known history  of hypertension.  Does have some mild swelling of the lower extremities.  Respiratory wise, she does have a nonproductive cough.  She did test  positive for COVID.  She does have a low O2 sat at 89%, but possibly  this was complicated by severe Raynaud's phenomenon.  Gastrointestinal  wise, the patient does complain of some constipation from opiates, but  she does take this apparently due to generalized

## 2024-01-12 NOTE — PROGRESS NOTES
Pharmacy consulted for the benefit of covid medications    Patient not on baseline supplemental oxygen. Is currently requiring 2 L/min oxygen via NC. Xray reading vascular congestion.    1/11: Awaiting CRP draw scheduled for tomorrow morning. Recommend continuing steroid treatment at this time. Will assess CRP and tomorrow's respiratory function to determine treatment plan. Potential candidate for baricitinib pending reassessment.    1/12: CRP <0.3, continue dexamethasone monotherapy, does not meet criteria for treatment.     Trinity Dee, PharmD, BCPS 1/12/2024 3:11 PM   [phone]

## 2024-01-12 NOTE — PROGRESS NOTES
Pharmacy consulted for the benefit of covid medications    Patient not on baseline supplemental oxygen. Is currently requiring 2 L/min oxygen via NC. Xray reading vascular congestion.    Awaiting CRP draw scheduled for tomorrow morning. Recommend continuing steroid treatment at this time. Will assess CRP and tomorrow's respiratory function to determine treatment plan. Potential candidate for baricitinib pending reassessment.        Scott Bynum, PharmD 1/11/2024 7:55 PM   [phone]

## 2024-01-12 NOTE — PROGRESS NOTES
Physical Therapy Initial Evaluation/Plan of Care    Room #:  0438/0438-01  Patient Name: Diallo Johnson  YOB: 1930  MRN: [id number]    Date of Service: 1/12/2024     Tentative placement recommendation: Subacute Rehab  Equipment recommendation: To be determined      Evaluating Physical Therapist: Winnie Montano, PT #[id number]      Specific Provider Orders/Date/Referring Provider :  01/12/24 1130    PT eval and treat  Start:  01/12/24 1130,   End:  01/12/24 1130,   ONE TIME,   Standing Count:  1 Occurrences,   R       Gladys Steiner MD    Admitting Diagnosis:   Hyponatremia [E87.1]  Hypoxia [R09.02]  Acute respiratory failure with hypoxia (HCC) [J96.01]  COVID-19 [U07.1]     Chronic back pain- primarily wheel chair bound  Fall on New Years  Surgery: none  Visit Diagnoses         Codes    Acute respiratory failure with hypoxia (HCC)    -  Primary J96.01    COVID-19     U07.1    Hyponatremia     E87.1            Patient Active Problem List   Diagnosis    Depressive disorder    Diverticulosis of sigmoid colon    Hyperlipidemia    Hypertensive disorder    Hypothyroidism    Impaired cognition    Lumbar radiculopathy    Sjogren's syndrome (HCC)    Recurrent major depressive disorder, in full remission (HCC)    Sepsis due to Streptococcus pneumoniae with acute hypoxic respiratory failure without septic shock (HCC)    Hypoxia        ASSESSMENT of Current Deficits Patient exhibits decreased strength, balance, and endurance impairing functional mobility, transfers, gait , gait distance, and tolerance to activity patient with difficulty answering questions due to marked hard of hearing. Able to stand with moderate assist however unable to advance lower extremities for gait. Patient requires continued skilled physical therapy to address concerns listed above for increased safety and function at discharge.         PHYSICAL THERAPY  PLAN OF CARE       Physical therapy plan of care is established based on physician

## 2024-01-12 NOTE — PROGRESS NOTES
Internal Medicine Progress Note    [facility]    Shamar Thomas D.O., EZEKIELI.                    Ha Mcdaniel D.O., FRANCIA Leos D.O.       Marj Acevedo, MSN, APRN, NP-C  Paul Anderson, MSN, APRN-CNP  Kain Orr, MSN, APRN-CNP     Primary Care Physician: Addis Cohen MD   Admitting Physician:  Shamar Thomas DO  Admission date and time: 1/11/2024  2:44 PM    Room:  [address] [place] [address]  Admitting diagnosis: Hyponatremia [E87.1]  Hypoxia [R09.02]  Acute respiratory failure with hypoxia (HCC) [J96.01]  COVID-19 [U07.1]    Patient Name: Diallo Johnson  MRN: [id number]    Date of Service: 1/12/2024     Subjective:  Diallo is a 89 y.o. female who was seen and examined today,1/12/2024, at the bedside.  Patient seems to be improved today.  Patient has evident microcytosis and iron studies will be obtained.  Still awaiting bed available on the floor.  The patient slept well with supplemental O2. Cough appears to be's slightly productive but overall she is clinically improving    Daughter was present during my examination.    Review of System:   Constitutional:   Denies fever or chills, weight loss or gain, fatigue or malaise.  HEENT:   Denies ear pain, sore throat, sinus or eye problems.  Dry eye  Cardiovascular:   Denies any chest pain, irregular heartbeats, or palpitations.   Respiratory:   Improved shortness of breath.  Gastrointestinal:   Denies nausea, vomiting, diarrhea, or constipation.  Denies any abdominal pain.  Genitourinary:    Denies any urgency, frequency, hematuria. Voiding  without difficulty.  Extremities:   Denies lower extremity swelling, edema or cyanosis.   Neurology:    Denies any headache or focal neurological deficits, Denies generalized weakness or memory difficulty.   Psch:   Denies being anxious or depressed.  Musculoskeletal:    Denies  myalgias, joint complaints or back pain.   Integumentary:   Denies any rashes, ulcers, or

## 2024-01-13 ENCOUNTER — APPOINTMENT (OUTPATIENT)
Dept: GENERAL RADIOLOGY | Age: 89
DRG: 177 | End: 2024-01-13
Payer: MEDICARE

## 2024-01-13 LAB
ALBUMIN SERPL-MCNC: 3.4 G/DL (ref 3.5–5.2)
ALP SERPL-CCNC: 118 U/L (ref 35–104)
ALT SERPL-CCNC: 9 U/L (ref 0–32)
ANION GAP SERPL CALCULATED.3IONS-SCNC: 14 MMOL/L (ref 7–16)
AST SERPL-CCNC: 17 U/L (ref 0–31)
BASOPHILS # BLD: 0.01 K/UL (ref 0–0.2)
BASOPHILS NFR BLD: 0 % (ref 0–2)
BILIRUB SERPL-MCNC: 0.3 MG/DL (ref 0–1.2)
BUN SERPL-MCNC: 17 MG/DL (ref 6–23)
CALCIUM SERPL-MCNC: 9.2 MG/DL (ref 8.6–10.2)
CHLORIDE SERPL-SCNC: 89 MMOL/L (ref 98–107)
CO2 SERPL-SCNC: 26 MMOL/L (ref 22–29)
CREAT SERPL-MCNC: 0.5 MG/DL (ref 0.5–1)
EOSINOPHIL # BLD: 0.01 K/UL (ref 0.05–0.5)
EOSINOPHILS RELATIVE PERCENT: 0 % (ref 0–6)
ERYTHROCYTE [DISTWIDTH] IN BLOOD BY AUTOMATED COUNT: 15.6 % (ref 11.5–15)
GFR SERPL CREATININE-BSD FRML MDRD: >60 ML/MIN/1.73M2
GLUCOSE SERPL-MCNC: 118 MG/DL (ref 74–99)
HCT VFR BLD AUTO: 33.4 % (ref 34–48)
HGB BLD-MCNC: 11.1 G/DL (ref 11.5–15.5)
IMM GRANULOCYTES # BLD AUTO: 0.05 K/UL (ref 0–0.58)
IMM GRANULOCYTES NFR BLD: 1 % (ref 0–5)
LYMPHOCYTES NFR BLD: 0.81 K/UL (ref 1.5–4)
LYMPHOCYTES RELATIVE PERCENT: 8 % (ref 20–42)
MAGNESIUM SERPL-MCNC: 1.8 MG/DL (ref 1.6–2.6)
MCH RBC QN AUTO: 26.4 PG (ref 26–35)
MCHC RBC AUTO-ENTMCNC: 33.2 G/DL (ref 32–34.5)
MCV RBC AUTO: 79.3 FL (ref 80–99.9)
MONOCYTES NFR BLD: 1.06 K/UL (ref 0.1–0.95)
MONOCYTES NFR BLD: 11 % (ref 2–12)
NEUTROPHILS NFR BLD: 81 % (ref 43–80)
NEUTS SEG NFR BLD: 8.09 K/UL (ref 1.8–7.3)
PLATELET # BLD AUTO: 521 K/UL (ref 130–450)
PMV BLD AUTO: 8.6 FL (ref 7–12)
POTASSIUM SERPL-SCNC: 3.4 MMOL/L (ref 3.5–5)
PROT SERPL-MCNC: 6.3 G/DL (ref 6.4–8.3)
RBC # BLD AUTO: 4.21 M/UL (ref 3.5–5.5)
SODIUM SERPL-SCNC: 129 MMOL/L (ref 132–146)
WBC OTHER # BLD: 10 K/UL (ref 4.5–11.5)

## 2024-01-13 PROCEDURE — 83735 ASSAY OF MAGNESIUM: CPT

## 2024-01-13 PROCEDURE — 36415 COLL VENOUS BLD VENIPUNCTURE: CPT

## 2024-01-13 PROCEDURE — 80053 COMPREHEN METABOLIC PANEL: CPT

## 2024-01-13 PROCEDURE — 2700000000 HC OXYGEN THERAPY PER DAY

## 2024-01-13 PROCEDURE — 85025 COMPLETE CBC W/AUTO DIFF WBC: CPT

## 2024-01-13 PROCEDURE — 6360000002 HC RX W HCPCS

## 2024-01-13 PROCEDURE — 71045 X-RAY EXAM CHEST 1 VIEW: CPT

## 2024-01-13 PROCEDURE — G0378 HOSPITAL OBSERVATION PER HR: HCPCS

## 2024-01-13 PROCEDURE — 6370000000 HC RX 637 (ALT 250 FOR IP)

## 2024-01-13 PROCEDURE — 94640 AIRWAY INHALATION TREATMENT: CPT

## 2024-01-13 PROCEDURE — 96372 THER/PROPH/DIAG INJ SC/IM: CPT

## 2024-01-13 PROCEDURE — 6370000000 HC RX 637 (ALT 250 FOR IP): Performed by: NURSE PRACTITIONER

## 2024-01-13 RX ORDER — MAGNESIUM SULFATE 1 G/100ML
1000 INJECTION INTRAVENOUS PRN
Status: DISCONTINUED | OUTPATIENT
Start: 2024-01-13 | End: 2024-01-15 | Stop reason: HOSPADM

## 2024-01-13 RX ORDER — POTASSIUM CHLORIDE 20 MEQ/1
40 TABLET, EXTENDED RELEASE ORAL PRN
Status: DISCONTINUED | OUTPATIENT
Start: 2024-01-13 | End: 2024-01-15 | Stop reason: HOSPADM

## 2024-01-13 RX ORDER — GAUZE BANDAGE 2" X 2"
100 BANDAGE TOPICAL DAILY
Status: DISCONTINUED | OUTPATIENT
Start: 2024-01-13 | End: 2024-01-15 | Stop reason: HOSPADM

## 2024-01-13 RX ORDER — POTASSIUM CHLORIDE 7.45 MG/ML
10 INJECTION INTRAVENOUS PRN
Status: DISCONTINUED | OUTPATIENT
Start: 2024-01-13 | End: 2024-01-15 | Stop reason: HOSPADM

## 2024-01-13 RX ORDER — DEXAMETHASONE 6 MG/1
6 TABLET ORAL DAILY
Status: DISCONTINUED | OUTPATIENT
Start: 2024-01-13 | End: 2024-01-15 | Stop reason: HOSPADM

## 2024-01-13 RX ORDER — FUROSEMIDE 20 MG/1
20 TABLET ORAL DAILY
Status: DISCONTINUED | OUTPATIENT
Start: 2024-01-13 | End: 2024-01-15 | Stop reason: HOSPADM

## 2024-01-13 RX ORDER — DEXAMETHASONE 6 MG/1
6 TABLET ORAL EVERY 24 HOURS
Status: DISCONTINUED | OUTPATIENT
Start: 2024-01-13 | End: 2024-01-13 | Stop reason: SDUPTHER

## 2024-01-13 RX ORDER — DEXAMETHASONE SODIUM PHOSPHATE 10 MG/ML
6 INJECTION INTRAMUSCULAR; INTRAVENOUS EVERY 24 HOURS
Status: DISCONTINUED | OUTPATIENT
Start: 2024-01-13 | End: 2024-01-13 | Stop reason: CLARIF

## 2024-01-13 RX ADMIN — BUDESONIDE 500 MCG: 0.5 SUSPENSION RESPIRATORY (INHALATION) at 17:31

## 2024-01-13 RX ADMIN — LATANOPROST 1 DROP: 50 SOLUTION OPHTHALMIC at 22:37

## 2024-01-13 RX ADMIN — DEXAMETHASONE 6 MG: 6 TABLET ORAL at 10:26

## 2024-01-13 RX ADMIN — ARFORMOTEROL TARTRATE 15 MCG: 15 SOLUTION RESPIRATORY (INHALATION) at 04:49

## 2024-01-13 RX ADMIN — PANTOPRAZOLE SODIUM 40 MG: 40 TABLET, DELAYED RELEASE ORAL at 05:37

## 2024-01-13 RX ADMIN — BUDESONIDE 500 MCG: 0.5 SUSPENSION RESPIRATORY (INHALATION) at 04:49

## 2024-01-13 RX ADMIN — PRAVASTATIN SODIUM 20 MG: 20 TABLET ORAL at 22:37

## 2024-01-13 RX ADMIN — Medication 100 MG: at 10:26

## 2024-01-13 RX ADMIN — POTASSIUM BICARBONATE 40 MEQ: 782 TABLET, EFFERVESCENT ORAL at 10:37

## 2024-01-13 RX ADMIN — TRAMADOL HYDROCHLORIDE 50 MG: 50 TABLET ORAL at 22:37

## 2024-01-13 RX ADMIN — AMLODIPINE BESYLATE 5 MG: 5 TABLET ORAL at 09:40

## 2024-01-13 RX ADMIN — SERTRALINE HYDROCHLORIDE 25 MG: 50 TABLET ORAL at 09:40

## 2024-01-13 RX ADMIN — LOSARTAN POTASSIUM 100 MG: 50 TABLET, FILM COATED ORAL at 09:40

## 2024-01-13 RX ADMIN — ARFORMOTEROL TARTRATE 15 MCG: 15 SOLUTION RESPIRATORY (INHALATION) at 17:32

## 2024-01-13 RX ADMIN — ENOXAPARIN SODIUM 40 MG: 100 INJECTION SUBCUTANEOUS at 22:37

## 2024-01-13 RX ADMIN — LEVOTHYROXINE SODIUM 100 MCG: 0.05 TABLET ORAL at 05:37

## 2024-01-13 ASSESSMENT — PAIN SCALES - GENERAL: PAINLEVEL_OUTOF10: 0

## 2024-01-13 NOTE — CARE COORDINATION
Advance Care Planning     Advance Care Planning Activator (Inpatient)  Conversation Note      Date of ACP Conversation: 1/13/2024     Conversation Conducted with: Pts dtr Zuly Johnson pt has dementia.      Health Care Decision Maker:     Current Designated Health Care Decision Maker:     Primary Decision Maker: Dayana Johnson - Child - [phone]    Secondary Decision Maker: Khadra Johnson - Child - [phone]      Care Preferences    Ventilation:  \"If you were in your present state of health and suddenly became very ill and were unable to breathe on your own, what would your preference be about the use of a ventilator (breathing machine) if it were available to you?\"      Would the patient desire the use of ventilator (breathing machine)?: no    \"If your health worsens and it becomes clear that your chance of recovery is unlikely, what would your preference be about the use of a ventilator (breathing machine) if it were available to you?\"     Would the patient desire the use of ventilator (breathing machine)?: No      Resuscitation  \"CPR works best to restart the heart when there is a sudden event, like a heart attack, in someone who is otherwise healthy. Unfortunately, CPR does not typically restart the heart for people who have serious health conditions or who are very sick.\"    \"In the event your heart stopped as a result of an underlying serious health condition, would you want attempts to be made to restart your heart (answer \"yes\" for attempt to resuscitate) or would you prefer a natural death (answer \"no\" for do not attempt to resuscitate)?\" no       [] Yes   [] No   Educated Patient / Decision Maker regarding differences between Advance Directives and portable DNR orders.    Length of ACP Conversation in minutes:  20 mins    Conversation Outcomes:      Follow-up plan:    [] Schedule follow-up conversation to continue planning  [] Referred individual to Provider for additional questions/concerns   []

## 2024-01-13 NOTE — CARE COORDINATION
Case Management Assessment  Initial Evaluation    Date/Time of Evaluation: 1/13/2024 3:13 PM  Assessment Completed by: AMY Francisco    If patient is discharged prior to next notation, then this note serves as note for discharge by case management.    Patient Name: Diallo Johnson                   YOB: 1930  Diagnosis: Hyponatremia [E87.1]  Hypoxia [R09.02]  Acute respiratory failure with hypoxia (HCC) [J96.01]  COVID-19 [U07.1]                   Date / Time: 1/11/2024  2:44 PM    Patient Admission Status: Observation   Readmission Risk (Low < 19, Mod (19-27), High > 27): No data recorded  Current PCP: Addis Cohen MD  PCP verified by CM? Yes    Chart Reviewed: Yes      History Provided by: Child/Family  Patient Orientation: Alert and Oriented, Person    Patient Cognition: Dementia / Early Alzheimer's      Advance Directives:      Code Status: Limited   Patient's Primary Decision Maker is: Legal Next of Kin    Primary Decision Maker: Dayana Johnson - Child - [phone]    Secondary Decision Maker: Khadra Johnson - Child - [phone]    Discharge Planning:    Patient lives with: Children with dtr Dayana has private duty from [facility]   Type of Home: House  Primary Care Giver: Family (resides with dtr Dayana also has private duty caregivers)  Patient Support Systems include: Children, Other (Comment) (private duty)     Current Financial resources:    Current community resources:    fercho: Durable Medical Equipment, Home Care       ADLS    Prior functional level: Mobility, Bathing, Dressing, Toileting (non ambulatory stand pivots)  Current functional level: Assistance with the following:, Other (see comment) (therapy ordered.)    PT AM-PAC: 9 /24  OT AM-PAC:   /24    Family can provide assistance at DC: Yes  Would you like Case Management to discuss the discharge plan with any other family members/significant others, and if so, who? Yes (dtr present)  Plans to Return to Present Housing: Yes  [facility] can accept and will need HHC ORDERS, can start day after discharge. NO hx of HHC and if absolutely needed dtr would prefer [facility] or [place]. Dtr feels pt will do better at home. Will  need ambulette  transport home. SW/CM to follow. AMY Ohara      The Plan for Transition of Care is related to the following treatment goals: HHC    The Patient and dtr Dayana was provided with a choice of provider and agrees   with the discharge plan. [x] Yes [] No    Freedom of choice list was provided with basic dialogue that supports the patient's individualized plan of care/goals, treatment preferences and shares the quality data associated with the providers. [x] Yes [] No    The Plan for Transition of Care is related to the following treatment goals of Hyponatremia [E87.1]  Hypoxia [R09.02]  Acute respiratory failure with hypoxia (HCC) [J96.01]  COVID-19 [U07.1]

## 2024-01-13 NOTE — PROGRESS NOTES
Progress Note  Admit Date:  1/11/2024  2:44 PM  Patient Name:Diallo Johnson     YOB: 1930     Age:89 y.o.    FACE TO FACE ENCOUNTER  01/13/24    Assessment/PLAN        Hospital Problems             Last Modified POA    * (Principal) COVID-19 1/12/2024 Yes    Hypoxia 1/12/2024 Yes   Pt from Dr Cohen's office with not feeling well  Pt has had a croupy cough for 7-10 days no phlegm  Has had fevers off/on   Has low body temp normally   12/4 had amoxicillin pre dental procedure   No o2 at home   Worked as ortho nurse  Social drink no tob  No pets  [place] travelled outside [place]ours  [place]rav[place] through out [place]  Afebrile tachy bp156/77 p70-90 2L  Wbc9.4 cr0.5   Covid+  Flu a/b neg   Covdi boosted   Vaccinated for flu/covid/pneumonia     ID FOLLOWING FOR  Covid  left lower lobe  could be either atelectasis or pneumonia.   -encourage IS   Follow covid biomarkers  Ceftriaxone azithromycin   Decadron     Subjective     Review of Systems     Interval History Status:    DOS  1/13/2024 afebrile on RA daughter present pt asleep feels better     Medications   Scheduled Meds:    [Held by provider] furosemide  20 mg Oral Daily    vitamin B-1  100 mg Oral Daily    dexAMETHasone  6 mg Oral Daily    amLODIPine  5 mg Oral Daily    latanoprost  1 drop Both Eyes Nightly    levothyroxine  100 mcg Oral Daily    levothyroxine  25 mcg Oral Once per day on Mon Wed Fri    losartan  100 mg Oral Daily    pravastatin  20 mg Oral Nightly    sertraline  25 mg Oral Daily    traMADol  50 mg Oral BID    arformoterol tartrate  15 mcg Nebulization BID RT    budesonide  500 mcg Nebulization BID RT    enoxaparin  40 mg SubCUTAneous Daily    pantoprazole  40 mg Oral QAM AC    cefTRIAXone (ROCEPHIN) IV  2,000 mg IntraVENous Q24H    azithromycin  500 mg IntraVENous Q24H     Continuous Infusions:   PRN Meds: magnesium sulfate, potassium chloride **OR** potassium alternative oral replacement **OR** potassium chloride, ipratropium 0.5  mg-albuterol 2.5 mg, perflutren lipid microspheres, Carboxymeth-Glycerin-Polysorb    Physical Examination      Vitals:  BP (!) 164/81   Pulse 90   Temp 98.6 °F (37 °C) (Axillary)   Resp 20   Ht 1.575 m (5' 2\")   Wt 66.7 kg (147 lb)   SpO2 94%   BMI 26.89 kg/m²     Body Habitus: normal/healthy weight   General Appearance: alert,   cooperative, looks stated age, no acute distress  Eyes: PERRLA, EOMI  HENT: mucous membranes moist, normocephalic atraumatic  Neck: supple, no masses  Cardiovascular: normal heart sounds, regular rate, regular rhythm  Respiratory: dec breath sounds, no respiratory distress, no accessory muscle use   Abdominal: normal bowel sounds, soft, non-tender,  non-distended  Musculoskeletal: no skeletal abnormalities, normal ROM  Skin: warm, dry, normal turgor, normal color  Neurological: nerves II - XII grossly normal and symmetric  Psychology: mood normal, affect normal    Labs/Imaging/Diagnostics   Labs:  Recent Labs     01/11/24  1515 01/12/24  0613 01/13/24  0826   WBC 9.4 5.7 10.0   RBC 4.25 4.07 4.21   HGB 11.5 10.5* 11.1*   HCT 33.6* 31.4* 33.4*   MCV 79.1* 77.1* 79.3*   RDW 15.4* 15.6* 15.6*    386 521*     Recent Labs     01/11/24  1515 01/12/24  0613 01/13/24  0826   * 131* 129*   K 3.8 4.0 3.4*   CL 92* 92* 89*   CO2 27 27 26   BUN 15 14 17   CREATININE 0.5 0.5 0.5   GLUCOSE 108* 152* 118*   MG  --   --  1.8     No results for input(s): \"PROTIME\", \"INR\" in the last 72 hours.  No results for input(s): \"APTT\" in the last 72 hours.  Recent Labs     01/11/24  1515 01/12/24  0613 01/13/24  0826   AST 25 19 17   ALT 12 9 9   BILITOT 0.4 0.2 0.3   ALKPHOS 140* 121* 118*     Recent Labs     01/11/24  1515 01/11/24  1612 01/12/24  0613 01/13/24  0826   CRP  --  <3.0  --   --    PROCAL  --  0.06 0.06  --    AST 25  --  19 17   ALT 12  --  9 9   TRIG  --   --  58  --      Lab Results   Component Value Date/Time    CHOL 121 01/12/2024 06:13 AM    TRIG 58 01/12/2024 06:13 AM    HDL

## 2024-01-13 NOTE — PROGRESS NOTES
Pharmacy consulted for the benefit of covid medications    Patient not on baseline supplemental oxygen. Is currently requiring 2 L/min oxygen via NC. Xray reading vascular congestion.    1/11: Awaiting CRP draw scheduled for tomorrow morning. Recommend continuing steroid treatment at this time. Will assess CRP and tomorrow's respiratory function to determine treatment plan. Potential candidate for baricitinib pending reassessment.    1/12: CRP <0.3, continue dexamethasone monotherapy, does not meet criteria for treatment.     1/13: CRP still pending from yesterday, has been weaned to room air. Continue current treatment.    Trinity Dee, PharmD, BCPS 1/13/2024 12:19 PM   [phone]

## 2024-01-13 NOTE — ACP (ADVANCE CARE PLANNING)
Advance Care Planning         The patient has appointed the following active healthcare agents:    Primary Decision Maker: Dayana Johnson - [phone]    Secondary Decision Maker: Khadra Johnson - [phone]    Dtr to bring in POA for Healthcare,

## 2024-01-13 NOTE — PROGRESS NOTES
Internal Medicine Progress Note    [facility]    Shamar Thomas D.O., FRANCIA Mcdaniel D.O., FRANCIA Leos D.O.       Marj Acevedo, MSN, APRN, NP-C  Paul Anderson, MSN, APRN-CNP  Kain Orr, MSN, APRN-CNP     Primary Care Physician: Addis Cohen MD   Admitting Physician:  Shamar Thomas DO  Admission date and time: 1/11/2024  2:44 PM    Room:  [address] [place] [address]  Admitting diagnosis: Hyponatremia [E87.1]  Hypoxia [R09.02]  Acute respiratory failure with hypoxia (HCC) [J96.01]  COVID-19 [U07.1]    Patient Name: Diallo Johnson  MRN: [id number]    Date of Service: 1/13/2024     Subjective:  Diallo is a 89 y.o. female who was seen and examined today,1/13/2024, at the bedside. She is confused with underlying dementia worsened by acute illness.  Per nursing she has removed cardiac monitor and her own IV site.  Unfortunately no family was present during my examination.    Review of System: Unable to be obtained secondary to altered mental status    Physical Exam:  No intake/output data recorded.    Intake/Output Summary (Last 24 hours) at 1/13/2024 0947  Last data filed at 1/13/2024 0542  Gross per 24 hour   Intake --   Output 1000 ml   Net -1000 ml     I/O last 3 completed shifts:  In: -   Out: 1800 [Urine:1800]  Patient Vitals for the past 96 hrs (Last 3 readings):   Weight   01/11/24 1458 66.7 kg (147 lb)       Vital Signs:   Blood pressure (!) 164/81, pulse 90, temperature 98.6 °F (37 °C), temperature source Axillary, resp. rate 20, height 1.575 m (5' 2\"), weight 66.7 kg (147 lb), SpO2 94 %.    General appearance:  Alert, awake, confused.  Oriented to person but disoriented to place, time and situation.  Head:  Normocephalic. No masses, lesions or tenderness.  Eyes:  PERRLA.  EOMI.  Sclera clear.  Buccal mucosa moist.  ENT:  Ears normal. Mucosa normal.  Neck:    Supple. Trachea midline. No thyromegaly. No JVD. No

## 2024-01-14 LAB
ALBUMIN SERPL-MCNC: 3.2 G/DL (ref 3.5–5.2)
ALP SERPL-CCNC: 103 U/L (ref 35–104)
ALT SERPL-CCNC: 11 U/L (ref 0–32)
ANION GAP SERPL CALCULATED.3IONS-SCNC: 12 MMOL/L (ref 7–16)
AST SERPL-CCNC: 23 U/L (ref 0–31)
BASOPHILS # BLD: 0 K/UL (ref 0–0.2)
BASOPHILS NFR BLD: 0 % (ref 0–2)
BILIRUB SERPL-MCNC: 0.4 MG/DL (ref 0–1.2)
BUN SERPL-MCNC: 18 MG/DL (ref 6–23)
CALCIUM SERPL-MCNC: 9 MG/DL (ref 8.6–10.2)
CHLORIDE SERPL-SCNC: 91 MMOL/L (ref 98–107)
CO2 SERPL-SCNC: 28 MMOL/L (ref 22–29)
CREAT SERPL-MCNC: 0.5 MG/DL (ref 0.5–1)
CRP SERPL HS-MCNC: 31 MG/L (ref 0–5)
CRP SERPL HS-MCNC: 63 MG/L (ref 0–5)
EOSINOPHIL # BLD: 0 K/UL (ref 0.05–0.5)
EOSINOPHILS RELATIVE PERCENT: 0 % (ref 0–6)
ERYTHROCYTE [DISTWIDTH] IN BLOOD BY AUTOMATED COUNT: 16 % (ref 11.5–15)
GFR SERPL CREATININE-BSD FRML MDRD: >60 ML/MIN/1.73M2
GLUCOSE SERPL-MCNC: 104 MG/DL (ref 74–99)
HCT VFR BLD AUTO: 34.3 % (ref 34–48)
HGB BLD-MCNC: 11.2 G/DL (ref 11.5–15.5)
IMM GRANULOCYTES # BLD AUTO: 0.04 K/UL (ref 0–0.58)
IMM GRANULOCYTES NFR BLD: 1 % (ref 0–5)
LYMPHOCYTES NFR BLD: 0.89 K/UL (ref 1.5–4)
LYMPHOCYTES RELATIVE PERCENT: 12 % (ref 20–42)
MCH RBC QN AUTO: 26 PG (ref 26–35)
MCHC RBC AUTO-ENTMCNC: 32.7 G/DL (ref 32–34.5)
MCV RBC AUTO: 79.6 FL (ref 80–99.9)
MONOCYTES NFR BLD: 1.02 K/UL (ref 0.1–0.95)
MONOCYTES NFR BLD: 13 % (ref 2–12)
NEUTROPHILS NFR BLD: 75 % (ref 43–80)
NEUTS SEG NFR BLD: 5.73 K/UL (ref 1.8–7.3)
PLATELET # BLD AUTO: 451 K/UL (ref 130–450)
PMV BLD AUTO: 8.8 FL (ref 7–12)
POTASSIUM SERPL-SCNC: 3.9 MMOL/L (ref 3.5–5)
PROT SERPL-MCNC: 6.2 G/DL (ref 6.4–8.3)
RBC # BLD AUTO: 4.31 M/UL (ref 3.5–5.5)
SODIUM SERPL-SCNC: 131 MMOL/L (ref 132–146)
WBC OTHER # BLD: 7.7 K/UL (ref 4.5–11.5)

## 2024-01-14 PROCEDURE — 96366 THER/PROPH/DIAG IV INF ADDON: CPT

## 2024-01-14 PROCEDURE — 6370000000 HC RX 637 (ALT 250 FOR IP): Performed by: NURSE PRACTITIONER

## 2024-01-14 PROCEDURE — 2580000003 HC RX 258: Performed by: SPECIALIST

## 2024-01-14 PROCEDURE — 1200000000 HC SEMI PRIVATE

## 2024-01-14 PROCEDURE — XW0DXM6 INTRODUCTION OF BARICITINIB INTO MOUTH AND PHARYNX, EXTERNAL APPROACH, NEW TECHNOLOGY GROUP 6: ICD-10-PCS | Performed by: INTERNAL MEDICINE

## 2024-01-14 PROCEDURE — 36415 COLL VENOUS BLD VENIPUNCTURE: CPT

## 2024-01-14 PROCEDURE — 96376 TX/PRO/DX INJ SAME DRUG ADON: CPT

## 2024-01-14 PROCEDURE — 6370000000 HC RX 637 (ALT 250 FOR IP)

## 2024-01-14 PROCEDURE — 85025 COMPLETE CBC W/AUTO DIFF WBC: CPT

## 2024-01-14 PROCEDURE — 6370000000 HC RX 637 (ALT 250 FOR IP): Performed by: SPECIALIST

## 2024-01-14 PROCEDURE — 94640 AIRWAY INHALATION TREATMENT: CPT

## 2024-01-14 PROCEDURE — 6360000002 HC RX W HCPCS

## 2024-01-14 PROCEDURE — 6360000002 HC RX W HCPCS: Performed by: SPECIALIST

## 2024-01-14 PROCEDURE — 86140 C-REACTIVE PROTEIN: CPT

## 2024-01-14 PROCEDURE — 2700000000 HC OXYGEN THERAPY PER DAY

## 2024-01-14 PROCEDURE — 80053 COMPREHEN METABOLIC PANEL: CPT

## 2024-01-14 RX ADMIN — PRAVASTATIN SODIUM 20 MG: 20 TABLET ORAL at 22:00

## 2024-01-14 RX ADMIN — LOSARTAN POTASSIUM 100 MG: 50 TABLET, FILM COATED ORAL at 09:43

## 2024-01-14 RX ADMIN — DEXAMETHASONE 6 MG: 6 TABLET ORAL at 09:42

## 2024-01-14 RX ADMIN — ARFORMOTEROL TARTRATE 15 MCG: 15 SOLUTION RESPIRATORY (INHALATION) at 18:45

## 2024-01-14 RX ADMIN — PANTOPRAZOLE SODIUM 40 MG: 40 TABLET, DELAYED RELEASE ORAL at 05:25

## 2024-01-14 RX ADMIN — BARICITINIB 4 MG: 2 TABLET, FILM COATED ORAL at 22:00

## 2024-01-14 RX ADMIN — AMLODIPINE BESYLATE 5 MG: 5 TABLET ORAL at 09:42

## 2024-01-14 RX ADMIN — Medication 100 MG: at 09:42

## 2024-01-14 RX ADMIN — IPRATROPIUM BROMIDE AND ALBUTEROL SULFATE 1 DOSE: .5; 2.5 SOLUTION RESPIRATORY (INHALATION) at 06:06

## 2024-01-14 RX ADMIN — AZITHROMYCIN MONOHYDRATE 500 MG: 500 INJECTION, POWDER, LYOPHILIZED, FOR SOLUTION INTRAVENOUS at 09:54

## 2024-01-14 RX ADMIN — ARFORMOTEROL TARTRATE 15 MCG: 15 SOLUTION RESPIRATORY (INHALATION) at 06:06

## 2024-01-14 RX ADMIN — TRAMADOL HYDROCHLORIDE 50 MG: 50 TABLET ORAL at 22:00

## 2024-01-14 RX ADMIN — BUDESONIDE 500 MCG: 0.5 SUSPENSION RESPIRATORY (INHALATION) at 18:45

## 2024-01-14 RX ADMIN — ENOXAPARIN SODIUM 40 MG: 100 INJECTION SUBCUTANEOUS at 22:00

## 2024-01-14 RX ADMIN — TRAMADOL HYDROCHLORIDE 50 MG: 50 TABLET ORAL at 09:56

## 2024-01-14 RX ADMIN — BUDESONIDE 500 MCG: 0.5 SUSPENSION RESPIRATORY (INHALATION) at 06:06

## 2024-01-14 RX ADMIN — LATANOPROST 1 DROP: 50 SOLUTION OPHTHALMIC at 22:07

## 2024-01-14 RX ADMIN — LEVOTHYROXINE SODIUM 100 MCG: 0.05 TABLET ORAL at 05:25

## 2024-01-14 RX ADMIN — SERTRALINE HYDROCHLORIDE 25 MG: 50 TABLET ORAL at 09:42

## 2024-01-14 RX ADMIN — IPRATROPIUM BROMIDE AND ALBUTEROL SULFATE 1 DOSE: .5; 2.5 SOLUTION RESPIRATORY (INHALATION) at 18:45

## 2024-01-14 RX ADMIN — CEFTRIAXONE SODIUM 2000 MG: 2 INJECTION, POWDER, FOR SOLUTION INTRAMUSCULAR; INTRAVENOUS at 09:41

## 2024-01-14 ASSESSMENT — PAIN SCALES - GENERAL
PAINLEVEL_OUTOF10: 4
PAINLEVEL_OUTOF10: 3

## 2024-01-14 ASSESSMENT — PAIN DESCRIPTION - LOCATION: LOCATION: BACK

## 2024-01-14 ASSESSMENT — PAIN DESCRIPTION - ORIENTATION: ORIENTATION: POSTERIOR;MID

## 2024-01-14 ASSESSMENT — PAIN DESCRIPTION - DESCRIPTORS: DESCRIPTORS: DISCOMFORT

## 2024-01-14 NOTE — PROGRESS NOTES
Trachea midline. No thyromegaly. No JVD. No bruits.  Heart:    Rhythm regular. Rate controlled.  S1 and S2.  Lungs:    Symmetrical expansion. Diminished. No wheezing, rales, or rhonchi.   Abdomen:   Soft. Non-tender. Non-distended. Bowel sounds positive. No organomegaly or masses.  No pain on palpation.  Extremities:    Peripheral pulses present.  No significant pitting peripheral edema.  No ulcers. No cyanosis. No clubbing.  Neurologic:    Alert, awake, confused.  Oriented to person but disoriented to place, time and situation. Generally weak without focal deficit.  Cranial nerves grossly intact. No focal weakness.  Psych:   Behavior is normal. Mood appears normal. Speech is not rapid and/or pressured.  Musculoskeletal:   No unilateral joint edema, erythema or warmth gait not assessed.  Integumentary:  No rashes  Skin normal color and texture.  Genitalia/Breast:  Deferred    Medication:  Scheduled Meds:   [Held by provider] furosemide  20 mg Oral Daily    vitamin B-1  100 mg Oral Daily    dexAMETHasone  6 mg Oral Daily    amLODIPine  5 mg Oral Daily    latanoprost  1 drop Both Eyes Nightly    levothyroxine  100 mcg Oral Daily    levothyroxine  25 mcg Oral Once per day on Mon Wed Fri    losartan  100 mg Oral Daily    pravastatin  20 mg Oral Nightly    sertraline  25 mg Oral Daily    traMADol  50 mg Oral BID    arformoterol tartrate  15 mcg Nebulization BID RT    budesonide  500 mcg Nebulization BID RT    enoxaparin  40 mg SubCUTAneous Daily    pantoprazole  40 mg Oral QAM AC    cefTRIAXone (ROCEPHIN) IV  2,000 mg IntraVENous Q24H    azithromycin  500 mg IntraVENous Q24H       Objective Data:  CBC with Differential:    Lab Results   Component Value Date/Time    WBC 7.7 01/14/2024 06:08 AM    RBC 4.31 01/14/2024 06:08 AM    HGB 11.2 01/14/2024 06:08 AM    HCT 34.3 01/14/2024 06:08 AM     01/14/2024 06:08 AM    MCV 79.6 01/14/2024 06:08 AM    MCH 26.0 01/14/2024 06:08 AM    MCHC 32.7 01/14/2024 06:08 AM    RDW  she worked with therapy and it can be determined if it is safe to do so.  We have asked that the therapy team to work with the patient today as, from a medical standpoint, she will likely be ready for discharge home tomorrow with home health care.    Diallo requires this high level of physician care and nursing on the IMC/Telemetry unit due the complexity of decision management and chance of rapid decline or death.  Continued cardiac monitoring and higher level of nursing are required. I am readily available for any further decision-making and intervention.     More than 50% of my  time was spent at the bedside counseling/coordinating care with the patient and/or family with face to face contact.  This time was spent reviewing notes and laboratory data as well as instructing and counseling the patient. Time I spent with the family or surrogate(s) is included only if the patient was incapable of providing the necessary information or participating in medical decisions. I also discussed the differential diagnosis and all of the proposed management plans with the patient and individuals accompanying the patient.    Paul Anderson, KIRAN - TODD,   1/14/2024  8:07 AM

## 2024-01-14 NOTE — PROGRESS NOTES
Progress Note  Admit Date:  1/11/2024  2:44 PM  Patient Name:Diallo Johnson     YOB: 1930     Age:89 y.o.    FACE TO FACE ENCOUNTER  01/14/24    Assessment/PLAN        Hospital Problems             Last Modified POA    * (Principal) COVID-19 1/12/2024 Yes    Hypoxia 1/12/2024 Yes   Pt from Dr Cohen's office with not feeling well  Pt has had a croupy cough for 7-10 days no phlegm  Has had fevers off/on   Has low body temp normally   12/4 had amoxicillin pre dental procedure   No o2 at home   Worked as ortho nurse  Social drink no tob  No pets  [place] travelled outside [place]  [place]rav[place] through out [place]  Afebrile tachy bp156/77 p70-90 2L  Wbc9.4 cr0.5   Covid+  Flu a/b neg   Covdi boosted   Vaccinated for flu/covid/pneumonia     ID FOLLOWING FOR  Covid on o2   left lower lobe  could be either atelectasis or pneumonia.   -encourage IS   Follow covid biomarkers  Ceftriaxone azithromycin   Decadron   uPdated daughter    Subjective     Review of Systems     Interval History Status:    DOS  1/14/2024 in chair more awake and alert has no c/o   1/13 afebrile on RA daughter present pt asleep feels better     Medications   Scheduled Meds:    [Held by provider] furosemide  20 mg Oral Daily    vitamin B-1  100 mg Oral Daily    dexAMETHasone  6 mg Oral Daily    amLODIPine  5 mg Oral Daily    latanoprost  1 drop Both Eyes Nightly    levothyroxine  100 mcg Oral Daily    levothyroxine  25 mcg Oral Once per day on Mon Wed Fri    losartan  100 mg Oral Daily    pravastatin  20 mg Oral Nightly    sertraline  25 mg Oral Daily    traMADol  50 mg Oral BID    arformoterol tartrate  15 mcg Nebulization BID RT    budesonide  500 mcg Nebulization BID RT    enoxaparin  40 mg SubCUTAneous Daily    pantoprazole  40 mg Oral QAM AC    cefTRIAXone (ROCEPHIN) IV  2,000 mg IntraVENous Q24H     Continuous Infusions:   PRN Meds: magnesium sulfate, potassium chloride **OR** potassium alternative oral replacement **OR**  potassium chloride, ipratropium 0.5 mg-albuterol 2.5 mg, perflutren lipid microspheres, Carboxymeth-Glycerin-Polysorb    Physical Examination      Vitals:  /72   Pulse 72   Temp 98.8 °F (37.1 °C) (Oral)   Resp 21   Ht 1.575 m (5' 2\")   Wt 66.7 kg (147 lb)   SpO2 (!) 87%   BMI 26.89 kg/m²     Body Habitus: normal/healthy weight   General Appearance: alert,   cooperative, no acute distress  Eyes: PERRLA, EOMI  HENT  normocephalic atraumatic  Neck: supple, no masses  Cardiovascular: normal heart sounds, regular rate, regular rhythm  Respiratory: dec breath sounds post , no respiratory distress   Abdominal: normal bowel sounds, soft, non-tender,  non-distended  Musculoskeletal: no skeletal abnormalities, normal ROM gloves on   Skin: no rash   Neurological: alert asnwers appropriately   Psychology: mood normal, affect normal    Labs/Imaging/Diagnostics   Labs:  Recent Labs     01/12/24  0613 01/13/24  0826 01/14/24  0608   WBC 5.7 10.0 7.7   RBC 4.07 4.21 4.31   HGB 10.5* 11.1* 11.2*   HCT 31.4* 33.4* 34.3   MCV 77.1* 79.3* 79.6*   RDW 15.6* 15.6* 16.0*    521* 451*       Recent Labs     01/12/24  0613 01/13/24  0826 01/14/24  0608   * 129* 131*   K 4.0 3.4* 3.9   CL 92* 89* 91*   CO2 27 26 28   BUN 14 17 18   CREATININE 0.5 0.5 0.5   GLUCOSE 152* 118* 104*   MG  --  1.8  --        No results for input(s): \"PROTIME\", \"INR\" in the last 72 hours.  No results for input(s): \"APTT\" in the last 72 hours.  Recent Labs     01/12/24  0613 01/13/24  0826 01/14/24  0608   AST 19 17 23   ALT 9 9 11   BILITOT 0.2 0.3 0.4   ALKPHOS 121* 118* 103       Recent Labs     01/11/24  1612 01/12/24  0613 01/13/24  0826 01/14/24  0608   CRP <3.0  --   --   --    PROCAL 0.06 0.06  --   --    AST  --  19 17 23   ALT  --  9 9 11   TRIG  --  58  --   --        Lab Results   Component Value Date/Time    CHOL 121 01/12/2024 06:13 AM    TRIG 58 01/12/2024 06:13 AM    HDL 55 01/12/2024 06:13 AM     Lab Results   Component

## 2024-01-15 VITALS
DIASTOLIC BLOOD PRESSURE: 77 MMHG | HEIGHT: 62 IN | RESPIRATION RATE: 20 BRPM | BODY MASS INDEX: 27.05 KG/M2 | WEIGHT: 147 LBS | HEART RATE: 69 BPM | SYSTOLIC BLOOD PRESSURE: 136 MMHG | OXYGEN SATURATION: 95 % | TEMPERATURE: 97.6 F

## 2024-01-15 LAB
ALBUMIN SERPL-MCNC: 3.2 G/DL (ref 3.5–5.2)
ALP SERPL-CCNC: 104 U/L (ref 35–104)
ALT SERPL-CCNC: 13 U/L (ref 0–32)
ANION GAP SERPL CALCULATED.3IONS-SCNC: 15 MMOL/L (ref 7–16)
AST SERPL-CCNC: 28 U/L (ref 0–31)
BASOPHILS # BLD: 0.01 K/UL (ref 0–0.2)
BASOPHILS NFR BLD: 0 % (ref 0–2)
BILIRUB SERPL-MCNC: 0.4 MG/DL (ref 0–1.2)
BUN SERPL-MCNC: 19 MG/DL (ref 6–23)
CALCIUM SERPL-MCNC: 9.2 MG/DL (ref 8.6–10.2)
CHLORIDE SERPL-SCNC: 90 MMOL/L (ref 98–107)
CO2 SERPL-SCNC: 21 MMOL/L (ref 22–29)
CREAT SERPL-MCNC: 0.5 MG/DL (ref 0.5–1)
EOSINOPHIL # BLD: 0.02 K/UL (ref 0.05–0.5)
EOSINOPHILS RELATIVE PERCENT: 0 % (ref 0–6)
ERYTHROCYTE [DISTWIDTH] IN BLOOD BY AUTOMATED COUNT: 15.9 % (ref 11.5–15)
GFR SERPL CREATININE-BSD FRML MDRD: >60 ML/MIN/1.73M2
GLUCOSE SERPL-MCNC: 84 MG/DL (ref 74–99)
HCT VFR BLD AUTO: 37.1 % (ref 34–48)
HGB BLD-MCNC: 11.9 G/DL (ref 11.5–15.5)
IMM GRANULOCYTES # BLD AUTO: 0.07 K/UL (ref 0–0.58)
IMM GRANULOCYTES NFR BLD: 1 % (ref 0–5)
LYMPHOCYTES NFR BLD: 1.19 K/UL (ref 1.5–4)
LYMPHOCYTES RELATIVE PERCENT: 12 % (ref 20–42)
MCH RBC QN AUTO: 26.2 PG (ref 26–35)
MCHC RBC AUTO-ENTMCNC: 32.1 G/DL (ref 32–34.5)
MCV RBC AUTO: 81.7 FL (ref 80–99.9)
MONOCYTES NFR BLD: 1.45 K/UL (ref 0.1–0.95)
MONOCYTES NFR BLD: 14 % (ref 2–12)
NEUTROPHILS NFR BLD: 73 % (ref 43–80)
NEUTS SEG NFR BLD: 7.48 K/UL (ref 1.8–7.3)
PLATELET CONFIRMATION: NORMAL
PLATELET, FLUORESCENCE: 484 K/UL (ref 130–450)
PMV BLD AUTO: 9.5 FL (ref 7–12)
POTASSIUM SERPL-SCNC: 3.7 MMOL/L (ref 3.5–5)
PROT SERPL-MCNC: 6.5 G/DL (ref 6.4–8.3)
PYRIDOXAL PHOS SERPL-SCNC: 22.1 NMOL/L (ref 20–125)
RBC # BLD AUTO: 4.54 M/UL (ref 3.5–5.5)
SODIUM SERPL-SCNC: 126 MMOL/L (ref 132–146)
WBC OTHER # BLD: 10.2 K/UL (ref 4.5–11.5)

## 2024-01-15 PROCEDURE — 97530 THERAPEUTIC ACTIVITIES: CPT

## 2024-01-15 PROCEDURE — 85025 COMPLETE CBC W/AUTO DIFF WBC: CPT

## 2024-01-15 PROCEDURE — 6360000002 HC RX W HCPCS

## 2024-01-15 PROCEDURE — 6370000000 HC RX 637 (ALT 250 FOR IP)

## 2024-01-15 PROCEDURE — 2700000000 HC OXYGEN THERAPY PER DAY

## 2024-01-15 PROCEDURE — 36415 COLL VENOUS BLD VENIPUNCTURE: CPT

## 2024-01-15 PROCEDURE — 97110 THERAPEUTIC EXERCISES: CPT

## 2024-01-15 PROCEDURE — 6370000000 HC RX 637 (ALT 250 FOR IP): Performed by: NURSE PRACTITIONER

## 2024-01-15 PROCEDURE — 97165 OT EVAL LOW COMPLEX 30 MIN: CPT

## 2024-01-15 PROCEDURE — 6360000002 HC RX W HCPCS: Performed by: SPECIALIST

## 2024-01-15 PROCEDURE — 94640 AIRWAY INHALATION TREATMENT: CPT

## 2024-01-15 PROCEDURE — 80053 COMPREHEN METABOLIC PANEL: CPT

## 2024-01-15 PROCEDURE — 2580000003 HC RX 258: Performed by: SPECIALIST

## 2024-01-15 PROCEDURE — 97116 GAIT TRAINING THERAPY: CPT

## 2024-01-15 PROCEDURE — 97535 SELF CARE MNGMENT TRAINING: CPT

## 2024-01-15 RX ORDER — CEFDINIR 300 MG/1
300 CAPSULE ORAL 2 TIMES DAILY
Qty: 10 CAPSULE | Refills: 0 | Status: SHIPPED | OUTPATIENT
Start: 2024-01-15 | End: 2024-01-15 | Stop reason: HOSPADM

## 2024-01-15 RX ORDER — FUROSEMIDE 20 MG/1
20 TABLET ORAL DAILY PRN
Qty: 30 TABLET | Refills: 0 | Status: ON HOLD | OUTPATIENT
Start: 2024-01-15

## 2024-01-15 RX ORDER — POTASSIUM CHLORIDE 750 MG/1
10 TABLET, EXTENDED RELEASE ORAL DAILY PRN
Qty: 30 TABLET | Refills: 0 | Status: ON HOLD | OUTPATIENT
Start: 2024-01-15

## 2024-01-15 RX ORDER — AZITHROMYCIN 250 MG/1
250 TABLET, FILM COATED ORAL SEE ADMIN INSTRUCTIONS
Qty: 6 TABLET | Refills: 0 | Status: SHIPPED | OUTPATIENT
Start: 2024-01-15 | End: 2024-01-20

## 2024-01-15 RX ORDER — BUDESONIDE AND FORMOTEROL FUMARATE DIHYDRATE 160; 4.5 UG/1; UG/1
2 AEROSOL RESPIRATORY (INHALATION) 2 TIMES DAILY
Qty: 10.2 G | Refills: 0 | Status: SHIPPED | OUTPATIENT
Start: 2024-01-15 | End: 2024-01-18 | Stop reason: SDUPTHER

## 2024-01-15 RX ORDER — THIAMINE MONONITRATE (VIT B1) 100 MG
100 TABLET ORAL DAILY
Refills: 0 | Status: ON HOLD | COMMUNITY
Start: 2024-01-15

## 2024-01-15 RX ORDER — DEXAMETHASONE 6 MG/1
6 TABLET ORAL
Qty: 7 TABLET | Refills: 0 | Status: SHIPPED | OUTPATIENT
Start: 2024-01-16 | End: 2024-01-23

## 2024-01-15 RX ORDER — ALBUTEROL SULFATE 90 UG/1
2 AEROSOL, METERED RESPIRATORY (INHALATION) 4 TIMES DAILY PRN
Qty: 18 G | Refills: 0 | Status: ON HOLD | OUTPATIENT
Start: 2024-01-15

## 2024-01-15 RX ADMIN — BUDESONIDE 500 MCG: 0.5 SUSPENSION RESPIRATORY (INHALATION) at 07:04

## 2024-01-15 RX ADMIN — CEFTRIAXONE SODIUM 2000 MG: 2 INJECTION, POWDER, FOR SOLUTION INTRAMUSCULAR; INTRAVENOUS at 11:49

## 2024-01-15 RX ADMIN — ARFORMOTEROL TARTRATE 15 MCG: 15 SOLUTION RESPIRATORY (INHALATION) at 07:04

## 2024-01-15 RX ADMIN — AMLODIPINE BESYLATE 5 MG: 5 TABLET ORAL at 11:48

## 2024-01-15 RX ADMIN — LEVOTHYROXINE SODIUM 25 MCG: 25 TABLET ORAL at 07:15

## 2024-01-15 RX ADMIN — SERTRALINE HYDROCHLORIDE 25 MG: 50 TABLET ORAL at 11:48

## 2024-01-15 RX ADMIN — Medication 100 MG: at 11:48

## 2024-01-15 RX ADMIN — TRAMADOL HYDROCHLORIDE 50 MG: 50 TABLET ORAL at 11:45

## 2024-01-15 RX ADMIN — LEVOTHYROXINE SODIUM 100 MCG: 0.05 TABLET ORAL at 07:15

## 2024-01-15 RX ADMIN — PANTOPRAZOLE SODIUM 40 MG: 40 TABLET, DELAYED RELEASE ORAL at 07:13

## 2024-01-15 RX ADMIN — LOSARTAN POTASSIUM 100 MG: 50 TABLET, FILM COATED ORAL at 11:47

## 2024-01-15 RX ADMIN — DEXAMETHASONE 6 MG: 6 TABLET ORAL at 11:47

## 2024-01-15 ASSESSMENT — PAIN SCALES - GENERAL
PAINLEVEL_OUTOF10: 0
PAINLEVEL_OUTOF10: 8

## 2024-01-15 ASSESSMENT — PAIN DESCRIPTION - ORIENTATION: ORIENTATION: LOWER

## 2024-01-15 ASSESSMENT — PAIN DESCRIPTION - DESCRIPTORS: DESCRIPTORS: DISCOMFORT;ACHING;SORE

## 2024-01-15 ASSESSMENT — PAIN DESCRIPTION - LOCATION: LOCATION: BACK

## 2024-01-15 NOTE — PROGRESS NOTES
Progress Note  Admit Date:  1/11/2024  2:44 PM  Patient Name:Diallo Johnson     YOB: 1930     Age:89 y.o.    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   -encourage IS   Follow covid biomarkers  CAN STOP Ceftriaxone   CONT azithromycin   Decadron   uPdated daughter  FOR D/C TODAY   WORKING WITH PT     Subjective     Review of Systems     Interval History Status:    DOS  1/15/2024 IN CHAIR WORKING WITH PT MORE ALERT   1/14 in chair more awake and alert has no c/o   1/13 afebrile on RA daughter present pt asleep feels better     Medications   Scheduled Meds:    [Held by provider] furosemide  20 mg Oral Daily    vitamin B-1  100 mg Oral Daily    dexAMETHasone  6 mg Oral Daily    amLODIPine  5 mg Oral Daily    latanoprost  1 drop Both Eyes Nightly    levothyroxine  100 mcg Oral Daily    levothyroxine  25 mcg Oral Once per day on Mon Wed Fri    losartan  100 mg Oral Daily    pravastatin  20 mg Oral Nightly    sertraline  25 mg Oral Daily    traMADol  50 mg Oral BID    arformoterol tartrate  15 mcg Nebulization BID RT    budesonide  500 mcg Nebulization BID RT    enoxaparin  40 mg SubCUTAneous Daily    pantoprazole  40 mg Oral QAM AC    cefTRIAXone (ROCEPHIN) IV  2,000 mg IntraVENous Q24H     Continuous Infusions:   PRN Meds:

## 2024-01-15 NOTE — PLAN OF CARE
Problem: Safety - Adult  Goal: Free from fall injury  Outcome: Completed     Problem: ABCDS Injury Assessment  Goal: Absence of physical injury  Outcome: Completed     Problem: Skin/Tissue Integrity  Goal: Absence of new skin breakdown  Description: 1.  Monitor for areas of redness and/or skin breakdown  2.  Assess vascular access sites hourly  3.  Every 4-6 hours minimum:  Change oxygen saturation probe site  4.  Every 4-6 hours:  If on nasal continuous positive airway pressure, respiratory therapy assess nares and determine need for appliance change or resting period.  Outcome: Completed     Problem: Confusion  Goal: Confusion, delirium, dementia, or psychosis is improved or at baseline  Description: INTERVENTIONS:  1. Assess for possible contributors to thought disturbance, including medications, impaired vision or hearing, underlying metabolic abnormalities, dehydration, psychiatric diagnoses, and notify attending LIP  2. Circle high risk fall precautions, as indicated  3. Provide frequent short contacts to provide reality reorientation, refocusing and direction  4. Decrease environmental stimuli, including noise as appropriate  5. Monitor and intervene to maintain adequate nutrition, hydration, elimination, sleep and activity  6. If unable to ensure safety without constant attention obtain sitter and review sitter guidelines with assigned personnel  7. Initiate Psychosocial CNS and Spiritual Care consult, as indicated  Outcome: Completed     Problem: Pain  Goal: Verbalizes/displays adequate comfort level or baseline comfort level  Outcome: Completed

## 2024-01-15 NOTE — PROGRESS NOTES
Physical Therapy Treatment Note/Plan of Care    Room #:  0438/0438-01  Patient Name: Diallo Johnson  YOB: 1930  MRN: [id number]    Date of Service: 1/15/2024     Tentative placement recommendation: Home with Home Health Physical Therapy and 24/7 supervision/assist   Equipment recommendation: To be determined      Evaluating Physical Therapist: Winnie Montano, PT #[id number]      Specific Provider Orders/Date/Referring Provider :  01/12/24 1130    PT eval and treat  Start:  01/12/24 1130,   End:  01/12/24 1130,   ONE TIME,   Standing Count:  1 Occurrences,   R       Gladys Steiner MD    Admitting Diagnosis:   Hyponatremia [E87.1]  Hypoxia [R09.02]  Acute respiratory failure with hypoxia (HCC) [J96.01]  COVID-19 [U07.1]     Chronic back pain- primarily wheel chair bound  Fall on New Years  Surgery: none  Visit Diagnoses         Codes    Acute respiratory failure with hypoxia (HCC)    -  Primary J96.01    Hyponatremia     E87.1            Patient Active Problem List   Diagnosis    Depressive disorder    Diverticulosis of sigmoid colon    Hyperlipidemia    Hypertensive disorder    Hypothyroidism    Impaired cognition    Lumbar radiculopathy    Sjogren's syndrome (HCC)    Recurrent major depressive disorder, in full remission (HCC)    Sepsis due to Streptococcus pneumoniae with acute hypoxic respiratory failure without septic shock (HCC)    Hypoxia    COVID-19        ASSESSMENT of Current Deficits Patient exhibits decreased strength, balance, and endurance impairing functional mobility, transfers, gait , gait distance, and tolerance to activity patient with difficulty answering questions due to marked hard of hearing. Able to stand with moderate assist, with slight posterior lean cues for upright and anterior leaning onto toes. Patient able to ambulated forward and backward, limited by o2 line and fatigue. Patient requires continued skilled physical therapy to address concerns listed above for increased safety

## 2024-01-15 NOTE — DISCHARGE SUMMARY
Internal Medicine Progress Note     [facility]     Shamar Thomas D.O., FRANCIA Mcdaniel D.O., FRANCIA Leos D.O.     Marj Acevedo, MSN, APRN, NP-C  Paul Anderson, MSN, APRN-CNP  Kain Orr, MSN, APRN, NP-C       Internal Medicine  Discharge Summary    NAME: Diallo Johnson  :  1930  MRN:  [id number]  PCP:Addis Cohen MD  ADMITTED: 2024      DISCHARGED: 1/15/24    ADMITTING PHYSICIAN: Ha Mcdaniel DO    CONSULTANT(S):   IP CONSULT TO INFECTIOUS DISEASES  IP CONSULT TO PHARMACY  IP CONSULT TO SOCIAL WORK     ADMITTING DIAGNOSIS:   Hyponatremia [E87.1]  Hypoxia [R09.02]  Acute respiratory failure with hypoxia (HCC) [J96.01]  COVID-19 [U07.1]     DISCHARGE DIAGNOSES:   Acute COVID-19 pneumonia with acute hypoxic respiratory failure  Acute metabolic encephalopathy superimposed on dementia   Sjogren syndrome with associated Raynaud's phenomenon and diffuse arthritis and conjunctivitis sicca  Hyponatremia probably secondary to volume overload.  Essential hypertension.  Coronary atherosclerosis.  Primary hypothyroidism.  Hyperlipidemia.  Depression, on Zoloft.  Chronic pain syndrome, on opiates of Ultram twice a day.  Glaucoma.  Ambulatory dysfunction  Microcytosis anemia    BRIEF HISTORY OF PRESENT ILLNESS:   This is a pleasant  89-year-old white female who was admitted [facility].  The  patient presented to the hospital here through the emergency room.  The  patient normally sees Dr. Cohen.  Prior to that, she did see Dr. Nilesh Moore, but has been currently following up with Dr. Addis Cohen.  The  patient states that she has been ill for approximately past days with  upper respiratory symptoms.  The patient was seen by the primary care  doctor today, which time she was getting better and was found to be  hypoxic.  The patient at that time was referred to the hospital here  where she was evaluated.  On presentation

## 2024-01-15 NOTE — CARE COORDINATION
Ss note: 1/15/917194:14 AM Discharge order noted. Pt is COVID POSITIVE, qualifies for 2 liters, orders in Epic, referral completed to [facility] with [employer], will await delivery of portable tank to bedside. [facility] has orders and aware of discharge. Pt resides with dtr and has private duty caregivers. Arranged PAS w/c transport home per dtr request, dtr has pts w/c in room and PAS notified, they will arrive at 1:30 pm to transport home via ambullette. Nursing updated. AMY Ohara

## 2024-01-15 NOTE — PROGRESS NOTES
Pharmacy Discharge Reconciliation & Education    Counseled patient on the importance of adherence with new medications. New medication(s), PRN furosemide, PRN potassium, Symbicort, PRN albuterol, azithromycin, and dexamethasone.  Discussed the purpose of each medication, as well as the dose, frequency, and common side effects/mitigation strategies.    Spoke with patient and daughter at bedside and reviewed discharge medications delivered already to bedside. Aware cefdinir stopped by ID provider and not delivered. Daughter educated on inhaler use, rescue vs maintenance, and to rinse out mouth after Symbicort. Educated on signs and symptoms of fluid overload as patient unable to get on a scale at this time. No other questions or concerns at this time.     Kalee Redding RPH  1/15/2024, 2:14 PM

## 2024-01-15 NOTE — PROGRESS NOTES
Pharmacy consulted for the benefit of covid medications    Patient not on baseline supplemental oxygen. Is currently requiring 2 L/min oxygen via NC. Xray reading vascular congestion.    1/11: Awaiting CRP draw scheduled for tomorrow morning. Recommend continuing steroid treatment at this time. Will assess CRP and tomorrow's respiratory function to determine treatment plan. Potential candidate for baricitinib pending reassessment.    1/12: CRP <0.3, continue dexamethasone monotherapy, does not meet criteria for treatment.     1/13: CRP still pending from yesterday, has been weaned to room air. Continue current treatment.    1/14: CRP up to 63 yesterday, today's CRP is still pending, patient is now requiring oxygen supplementation. Will give x 1 dose of baricitinib and re-evaluate tomorrow for continuation.     Trinity Dee, PharmD, BCPS 1/14/2024 8:44 PM   [phone]

## 2024-01-15 NOTE — PROGRESS NOTES
CLINICAL PHARMACY NOTE: MEDS TO BEDS    Total # of Prescriptions Filled: 7   The following medications were delivered to the patient:  Dexamethasone 6 mg  Furosemide 20 mg  Potassium chloride er 10  Cefdinir 300 mg  Albuterol sulfate hfa  Azithromycin 250 mg  Fluticasone-salmeterol 250/50     Additional Documentation:  Delivered to desk.

## 2024-01-15 NOTE — DISCHARGE INSTRUCTIONS
Your information:  Name: Diallo Johnson  : 1930    Your instructions:    YOU ARE BEING DISCHARGED HOME. PLEASE MAKE AND KEEP YOUR FOLLOW UP APPOINTMENTS.      What to do after you leave the hospital:    Recommended diet: {diet:64344}    Recommended activity: {discharge activity:54574}        The following personal items were collected during your admission and were returned to you:    Belongings  Dental Appliances: Uppers (top partial plate)  Vision - Corrective Lenses: Eyeglasses  Hearing Aid: Bilateral hearing aids  Clothing: At home  Jewelry: Earrings (one set earings)  Body Piercings Removed: No  Electronic Devices: None  Weapons (Notify Protective Services/Security): None  Other Valuables: At home  Home Medications: None  Valuables Given To: Family (Comment)  Provide Name(s) of Who Valuable(s) Were Given To: Dayana Johnson  Responsible person(s) in the waiting room: Dayana Johnson  Patient approves for provider to speak to responsible person post operatively: Yes (Both Daughters Dayana Elizabeth, Khadra Johnson)    Information obtained by:  By signing below, I understand that if any problems occur once I leave the hospital I am to contact ***.  I understand and acknowledge receipt of the instructions indicated above.

## 2024-01-15 NOTE — PROGRESS NOTES
OCCUPATIONAL THERAPY INITIAL EVALUATION    [facility]  [address] [place]        Date:1/15/2024                                                  Patient Name: Diallo Johnson    MRN: [id number]    : 1930    Room: [address] [place] [address]      Evaluating OT: Tiffany Wu OTR/L; [id number]     Referring Provider and Specific Provider Orders/Date:      24  OT eval and treat  Start:  24,   End:  24,   ONE TIME,   Standing Count:  1 Occurrences,   R         Gladys Steiner MD      Placement Recommendation: HHOT with assistance from daughter and caregivers used prior to admission  Comment: daughter present during evaluation and feels she can take patient home at the patients current level of function.        Diagnosis:   1. Acute respiratory failure with hypoxia (HCC)    2. COVID-19    3. Hyponatremia         Surgery: none       Pertinent Medical History:       Past Medical History:   Diagnosis Date    Cataract     Hx of Sjogren's disease (HCC)     Raynaud disease     Thyroid cancer (HCC)          Past Surgical History:   Procedure Laterality Date    BREAST SURGERY      CHOLECYSTECTOMY      COLONOSCOPY      EYE SURGERY      HYSTERECTOMY (CERVIX STATUS UNKNOWN)      JOINT REPLACEMENT        Precautions:  Fall Risk, Droplet Plus, COVID, pure wick, O2, Dementia      Assessment of current deficits:     [x] Functional mobility  [x]ADLs  [x] Strength               []Cognition    [x] Functional transfers   [x] IADLs         [] Safety Awareness   [x]Endurance    [] Fine Coordination              [x] Balance      [] Vision/perception   []Sensation     []Gross Motor Coordination  [] ROM  [] Delirium                   [] Motor Control     OT PLAN OF CARE   OT POC based on physician orders, patient diagnosis and results of clinical assessment    Frequency/Duration 1-3 days/wk for 2 weeks PRN     Specific OT Treatment Interventions to include:   *     OT Eval Medium 20067      OT Eval High 87805      OT Re-Eval 93621            ADL/Self Care 67141 28 2    Therapeutic Activities 94795  10  1    Therapeutic Ex 29117       Orthotic Management 88835       Manual [id number]     Neuro Re-Ed 55286       Non-Billable Time        Evaluation Time additionally includes thorough review of current medical information, gathering information on past medical history/social history and prior level of function, interpretation of standardized testing/informal observation of tasks, assessment of data and development of plan of care and goals.        Evaluating OT: Tiffany Wu OTR/L; [id number]

## 2024-01-18 RX ORDER — BUDESONIDE AND FORMOTEROL FUMARATE DIHYDRATE 80; 4.5 UG/1; UG/1
2 AEROSOL RESPIRATORY (INHALATION) 2 TIMES DAILY
Qty: 10.2 G | Refills: 3 | Status: SHIPPED | OUTPATIENT
Start: 2024-01-18

## 2024-01-22 ENCOUNTER — TELEPHONE (OUTPATIENT)
Dept: PRIMARY CARE CLINIC | Age: 89
End: 2024-01-22

## 2024-01-22 DIAGNOSIS — J96.01 SEPSIS DUE TO STREPTOCOCCUS PNEUMONIAE WITH ACUTE HYPOXIC RESPIRATORY FAILURE WITHOUT SEPTIC SHOCK (HCC): Primary | ICD-10-CM

## 2024-01-22 DIAGNOSIS — A40.3 SEPSIS DUE TO STREPTOCOCCUS PNEUMONIAE WITH ACUTE HYPOXIC RESPIRATORY FAILURE WITHOUT SEPTIC SHOCK (HCC): Primary | ICD-10-CM

## 2024-01-22 DIAGNOSIS — R65.20 SEPSIS DUE TO STREPTOCOCCUS PNEUMONIAE WITH ACUTE HYPOXIC RESPIRATORY FAILURE WITHOUT SEPTIC SHOCK (HCC): Primary | ICD-10-CM

## 2024-01-22 NOTE — TELEPHONE ENCOUNTER
Daughter called and stated that her mom has given up on life, she's not eating, drinking, taking medication, basically she has given up.  Daughter wanted you to know so you knew and would like to know if you could please do a referral for Hospice to come in and evaluate her ( [facility]) please.